# Patient Record
Sex: MALE | Race: BLACK OR AFRICAN AMERICAN
[De-identification: names, ages, dates, MRNs, and addresses within clinical notes are randomized per-mention and may not be internally consistent; named-entity substitution may affect disease eponyms.]

---

## 2017-10-09 ENCOUNTER — HOSPITAL ENCOUNTER (EMERGENCY)
Dept: HOSPITAL 62 - ER | Age: 67
Discharge: HOME | End: 2017-10-09
Payer: OTHER GOVERNMENT

## 2017-10-09 VITALS — SYSTOLIC BLOOD PRESSURE: 154 MMHG | DIASTOLIC BLOOD PRESSURE: 74 MMHG

## 2017-10-09 DIAGNOSIS — K52.1: Primary | ICD-10-CM

## 2017-10-09 DIAGNOSIS — R53.81: ICD-10-CM

## 2017-10-09 DIAGNOSIS — F17.200: ICD-10-CM

## 2017-10-09 DIAGNOSIS — R91.1: ICD-10-CM

## 2017-10-09 DIAGNOSIS — T37.3X5A: ICD-10-CM

## 2017-10-09 LAB
ALBUMIN SERPL-MCNC: 4 G/DL (ref 3.5–5)
ALP SERPL-CCNC: 81 U/L (ref 38–126)
ALT SERPL-CCNC: 23 U/L (ref 21–72)
ANION GAP SERPL CALC-SCNC: 12 MMOL/L (ref 5–19)
APPEARANCE UR: CLEAR
AST SERPL-CCNC: 19 U/L (ref 17–59)
BASOPHILS # BLD AUTO: 0.1 10^3/UL (ref 0–0.2)
BASOPHILS NFR BLD AUTO: 1.1 % (ref 0–2)
BILIRUB DIRECT SERPL-MCNC: 0.4 MG/DL (ref 0–0.4)
BILIRUB SERPL-MCNC: 0.7 MG/DL (ref 0.2–1.3)
BILIRUB UR QL STRIP: NEGATIVE
BUN SERPL-MCNC: 10 MG/DL (ref 7–20)
CALCIUM: 9.7 MG/DL (ref 8.4–10.2)
CHLORIDE SERPL-SCNC: 98 MMOL/L (ref 98–107)
CO2 SERPL-SCNC: 35 MMOL/L (ref 22–30)
CREAT SERPL-MCNC: 0.8 MG/DL (ref 0.52–1.25)
EOSINOPHIL # BLD AUTO: 0.3 10^3/UL (ref 0–0.6)
EOSINOPHIL NFR BLD AUTO: 5.2 % (ref 0–6)
ERYTHROCYTE [DISTWIDTH] IN BLOOD BY AUTOMATED COUNT: 13.9 % (ref 11.5–14)
GLUCOSE SERPL-MCNC: 89 MG/DL (ref 75–110)
GLUCOSE UR STRIP-MCNC: NEGATIVE MG/DL
HCT VFR BLD CALC: 53.5 % (ref 37.9–51)
HGB BLD-MCNC: 18 G/DL (ref 13.5–17)
HGB HCT DIFFERENCE: 0.5
KETONES UR STRIP-MCNC: 20 MG/DL
LIPASE SERPL-CCNC: 37.1 U/L (ref 23–300)
LYMPHOCYTES # BLD AUTO: 1.4 10^3/UL (ref 0.5–4.7)
LYMPHOCYTES NFR BLD AUTO: 25.3 % (ref 13–45)
MCH RBC QN AUTO: 29.8 PG (ref 27–33.4)
MCHC RBC AUTO-ENTMCNC: 33.7 G/DL (ref 32–36)
MCV RBC AUTO: 89 FL (ref 80–97)
MONOCYTES # BLD AUTO: 0.6 10^3/UL (ref 0.1–1.4)
MONOCYTES NFR BLD AUTO: 11.2 % (ref 3–13)
NEUTROPHILS # BLD AUTO: 3.2 10^3/UL (ref 1.7–8.2)
NEUTS SEG NFR BLD AUTO: 57.2 % (ref 42–78)
NITRITE UR QL STRIP: NEGATIVE
PH UR STRIP: 7 [PH] (ref 5–9)
POTASSIUM SERPL-SCNC: 4.2 MMOL/L (ref 3.6–5)
PROT SERPL-MCNC: 7.1 G/DL (ref 6.3–8.2)
PROT UR STRIP-MCNC: NEGATIVE MG/DL
RBC # BLD AUTO: 6.04 10^6/UL (ref 4.35–5.55)
SODIUM SERPL-SCNC: 144.7 MMOL/L (ref 137–145)
SP GR UR STRIP: 1.01
UROBILINOGEN UR-MCNC: NEGATIVE MG/DL (ref ?–2)
WBC # BLD AUTO: 5.6 10^3/UL (ref 4–10.5)

## 2017-10-09 PROCEDURE — 81001 URINALYSIS AUTO W/SCOPE: CPT

## 2017-10-09 PROCEDURE — 74176 CT ABD & PELVIS W/O CONTRAST: CPT

## 2017-10-09 PROCEDURE — 36415 COLL VENOUS BLD VENIPUNCTURE: CPT

## 2017-10-09 PROCEDURE — 80053 COMPREHEN METABOLIC PANEL: CPT

## 2017-10-09 PROCEDURE — 85025 COMPLETE CBC W/AUTO DIFF WBC: CPT

## 2017-10-09 PROCEDURE — 99284 EMERGENCY DEPT VISIT MOD MDM: CPT

## 2017-10-09 PROCEDURE — 83690 ASSAY OF LIPASE: CPT

## 2017-10-09 NOTE — ER DOCUMENT REPORT
ED General





- General


Chief Complaint: Abdominal Pain


Stated Complaint: ABDOMINAL PAIN


Time Seen by Provider: 10/09/17 14:29


Mode of Arrival: Ambulatory


Information source: Patient


Notes: 





Patient reports that he went to the [facility] and received a one-time 

dose of Flagyl.  He states after that she has had significant diarrhea and 

lower abdominal cramping.  Some nausea but no vomiting.  He is also decreased 

appetite.  The cramping has been constant.  Nothing makes it better or worse.  

It is bilateral lower quadrants but does not radiate.  It is been moderate in 

intensity.  No pain in his penis.  No penile discharge.  No testicular pain or 

swelling.


TRAVEL OUTSIDE OF THE [place] IN LAST 30 DAYS: No





- Related Data


Allergies/Adverse Reactions: 


 





No Known Allergies Allergy (Verified 09/07/14 11:13)


 











Past Medical History





- Social History


Smoking Status: Current Every Day Smoker


Chew tobacco use (# tins/day): No


Frequency of alcohol use: Heavy


Drug Abuse: Marijuana


Family History: Reviewed & Not Pertinent


Patient has suicidal ideation: No


Patient has homicidal ideation: No


Pulmonary Medical History: 


   Denies: Hx Tuberculosis


Neurological Medical History: Denies: Hx Seizures


Renal/ Medical History: Denies: Hx Peritoneal Dialysis


Musculoskeltal Medical History: Reports Hx Arthritis


Past Surgical History: Reports: Hx Orthopedic Surgery - nimco in the right leg.  

Denies: Hx Pacemaker





- Immunizations


Hx Diphtheria, Pertussis, Tetanus Vaccination: Yes - unknown





Review of Systems





- Review of Systems


Constitutional: Malaise.  denies: Chills, Fever


Cardiovascular: denies: Chest pain, Dyspnea


Respiratory: denies: Cough, Short of breath


Gastrointestinal: Diarrhea.  denies: Vomiting


-: Yes All other systems reviewed and negative





Physical Exam





- Vital signs


Vitals: 





 











Temp Pulse Resp BP Pulse Ox


 


 97.9 F   69   21 H  143/95 H  97 


 


 10/09/17 14:03  10/09/17 14:03  10/09/17 14:03  10/09/17 14:03  10/09/17 14:03











Interpretation: Hypertensive





- General


General appearance: Appears well, Alert





- HEENT


Head: Normocephalic, Atraumatic


Eyes: Normal


Pupils: PERRL





- Respiratory


Respiratory status: No respiratory distress


Chest status: Nontender


Breath sounds: Normal


Chest palpation: Normal





- Cardiovascular


Rhythm: Regular


Heart sounds: Normal auscultation


Murmur: No





- Abdominal


Inspection: Normal


Distension: No distension


Bowel sounds: Normal


Tenderness: Tender - bilat LQ. no rebound or guarding


Organomegaly: No organomegaly





- Back


Back: Normal, Nontender





- Extremities


General upper extremity: Normal inspection, Nontender, Normal color, Normal ROM

, Normal temperature


General lower extremity: Normal inspection, Nontender, Normal color, Normal ROM

, Normal temperature, Normal weight bearing.  No: Silvestre's sign





- Neurological


Neuro grossly intact: Yes


Cognition: Normal


Orientation: AAOx4


[place] Coma Scale Eye Opening: Spontaneous


[place] Coma Scale Verbal: Oriented


[place] Coma Scale Motor: Obeys Commands


[place] Coma Scale Total: 15


Speech: Normal


Motor strength normal: LUE, RUE, LLE, RLE


Sensory: Normal





- Psychological


Associated symptoms: Normal affect, Normal mood





- Skin


Skin Temperature: Warm


Skin Moisture: Dry


Skin Color: Normal





Course





- Vital Signs


Vital signs: 





 











Temp Pulse Resp BP Pulse Ox


 


 97.9 F   69   21 H  143/95 H  97 


 


 10/09/17 14:03  10/09/17 14:03  10/09/17 14:03  10/09/17 14:03  10/09/17 14:03














- Laboratory


Result Diagrams: 


 10/09/17 14:40





 10/09/17 14:40


Laboratory results interpreted by me: 





 











  10/09/17 10/09/17 10/09/17





  14:40 14:40 14:40


 


RBC  6.04 H  


 


Hgb  18.0 H  


 


Hct  53.5 H  


 


Carbon Dioxide   35 H 


 


Urine Ketones    20 H














- Diagnostic Test


Radiology reviewed: Image reviewed, Reports reviewed - CT shows no acute 

pathology that would explain the pain.  Patient does have a 3 mm pulmonary 

nodule which she was educated about the need for follow-up for.





Discharge





- Discharge


Clinical Impression: 


 Diarrhea due to drug





Condition: Stable


Disposition: HOME, SELF-CARE


Instructions:  Abdominal Pain ([facility])


Additional Instructions: 


Please follow-up with your primary care physician as soon as possible.





You have a very small 3 mm nodule in your lung.  This is most likely not a 

cancerous nodule but will need to be rechecked to make sure it is safe.  This 

needs to be reevaluated within 6 months by another CT scan of your chest.  

Please ask your  primary care provider to schedule this.  


Prescriptions: 


Loperamide HCl [Imodium 2 mg Capsule] 2 mg PO Q4HP PRN #21 cap


 PRN Reason: 


Forms:  Elevated Blood Pressure


Referrals: 


EUGENIA AMES MD [ACTIVE STAFF] - Follow up in 1 week

## 2017-10-09 NOTE — RADIOLOGY REPORT (SQ)
EXAM DESCRIPTION:  CT ABD/PELVIS NO ORAL OR IV



COMPLETED DATE/TIME:  10/9/2017 2:49 pm



REASON FOR STUDY:  rlq pain



COMPARISON:  None.



TECHNIQUE:  CT scan of the abdomen and pelvis performed without intravenous or oral contrast. Images 
reviewed with lung, soft tissue, and bone windows. Reconstructed coronal and sagittal MPR images revi
ewed. All images stored on PACS.

All CT scanners at this facility use dose modulation, iterative reconstruction, and/or weight based d
osing when appropriate to reduce radiation dose to as low as reasonably achievable (ALARA).

[facility]: Dose Right  [facility]: CareDose    [facility]: Dose Right    [facility]: Teradose 4D    [facility]: Smart Numascale



RADIATION DOSE:  Up-to-date CT equipment and radiation dose reduction techniques were employed. CTDIv
ol: 6.7 mGy. DLP: 311 mGy-cm.mGy.



LIMITATIONS:  None.



FINDINGS:  LOWER CHEST: There is a 3 mm nodule at the left hemidiaphragm on image 10 series 4.

NON-CONTRASTED LIVER, SPLEEN, ADRENALS: Evaluation limited by lack of IV contrast. No identified sign
ificant masses.

PANCREAS: No masses. No peripancreatic inflammatory changes.

GALLBLADDER: No identified stones by CT criteria. No inflammatory changes to suggest cholecystitis.

RIGHT KIDNEY AND URETER: No suspicious masses. Assessment limited by lack of IV contrast.   No signif
icant calcifications.   No hydronephrosis or hydroureter.

LEFT KIDNEY AND URETER: No suspicious masses. Assessment limited by lack of IV contrast.   No signifi
cant calcifications.   No hydronephrosis or hydroureter.

AORTA AND RETROPERITONEUM: No aneurysm. No retroperitoneal masses or adenopathy.

BOWEL AND PERITONEAL CAVITY: No obvious masses or inflammatory changes. No free fluid.

APPENDIX: Not identified.

PELVIS, BLADDER, AND ABDOMINAL WALL:No abnormal masses. No free fluid. Bladder normal.

BONES: Lumbar degenerative disc changes.  Scoliosis.  No osseous lesions.

OTHER: No other significant finding.



IMPRESSION:  1.  3 mm left pulmonary nodule.

2.  Lumbar degenerative changes.

3.  There are no findings that explain patient's pain.



COMMENT:  Quality ID # [id number]: Final reports with documentation of one or more dose reduction techniques
 (e.g., Automated exposure control, adjustment of the mA and/or kV according to patient size, use of 
iterative reconstruction technique)



TECHNICAL DOCUMENTATION:  JOB ID:  [id number]

 [employer]- All Rights Reserved

## 2020-03-10 ENCOUNTER — HOSPITAL ENCOUNTER (OUTPATIENT)
Dept: HOSPITAL 62 - RAD | Age: 70
End: 2020-03-10
Attending: CLINICAL NURSE SPECIALIST
Payer: OTHER GOVERNMENT

## 2020-03-10 DIAGNOSIS — R91.8: Primary | ICD-10-CM

## 2020-03-10 PROCEDURE — A9552 F18 FDG: HCPCS

## 2020-03-10 PROCEDURE — 78815 PET IMAGE W/CT SKULL-THIGH: CPT

## 2020-03-10 NOTE — RADIOLOGY REPORT (SQ)
EXAM DESCRIPTION:  PET CT SKULL/THIGH



COMPLETED DATE/TIME:  3/10/2020 12:39 pm



REASON FOR STUDY:  OTHER NONSPECIFIC ABNORMAL FINDING OF LUNG FIELD R91.8  OTHER NONSPECIFIC ABNORMAL
 FINDING OF LUNG FIELD



COMPARISON:  CT abdomen pelvis 10/9/2017

PET-CT 1/31/2016



RADIONUCLIDE AND DOSE:  9.3 mCi F18 FDG

The route of agent administration: Intravenous



FASTING BLOOD SUGAR:  91 mg/dl



CONTRAST TYPE AND DOSE:  No CT contrast given.



TECHNIQUE:  Blood glucose level was verified.  Above dose of FDG was injected intravenously.  2-D seg
mented attenuation correction images were obtained from the base of the skull to the midthighs.  Nonc
ontrast CT images were obtained for attenuation correction and fusion with emission images.  CT image
s were performed without oral or intravenous contrast and are not sensitive for parenchymal lesions. 
 A series of overlapping emission PET images were obtained.  Images reviewed and manipulated at [facility] work station by the radiologist.  Images stored on PACS.



LIMITATIONS:  None.



FINDINGS:  HEAD AND NECK: No areas of abnormal metabolic activity in the soft tissues of the head and
 neck.

CHEST: In the right upper lobe, a low-density 1.8 x 1.5 cm mass is present with lobular borders immed
iately ventral to the right hilum on axial image 90.  This is non metabolic, with SUV of 0.8 (was 1.4
 x 1.3 cm in size with SUV of 0.8 on PET-CT 1/31/2016).

No hilar or mediastinal abnormal activity.  No other lung nodules.

ABDOMEN AND PELVIS: No areas of abnormal metabolic activity in the abdomen or pelvis.  Expected physi
ologic activity is present in the genitourinary system and bowel.

PROXIMAL LOWER EXTREMITIES: No areas of abnormal metabolic activity in the soft tissues of the lower 
extremities.

BONES: No abnormal metabolic activity in the visualized skeleton.

ADDITIONAL CT FINDINGS: Rightward lateral bladder is irregular in shape with a mass at the right blad
earle trigone, 4.4 cm in size.  SUV of this mass is 10

OTHER: Liver background activity 1.8 SUV.  Blood pool background activity 1.7 SUV.



IMPRESSION:  Non metabolic 1.8 x 1.5 cm lung mass just ventral to the right hilum.  This is similar c
ompared to PET-CT in 2016.

Incidental finding of a 4.4 cm rightward bladder trigone mass worrisome for primary bladder tumor



TECHNICAL DOCUMENTATION:  JOB ID:  [id number]

 2011 [employer]- All Rights Reserved



Reading location - IP/workstation name: [email or web address]

## 2020-08-24 ENCOUNTER — HOSPITAL ENCOUNTER (EMERGENCY)
Dept: HOSPITAL 62 - ER | Age: 70
Discharge: HOME | End: 2020-08-24
Payer: OTHER GOVERNMENT

## 2020-08-24 VITALS — SYSTOLIC BLOOD PRESSURE: 154 MMHG | DIASTOLIC BLOOD PRESSURE: 94 MMHG

## 2020-08-24 DIAGNOSIS — R19.09: ICD-10-CM

## 2020-08-24 DIAGNOSIS — C67.9: Primary | ICD-10-CM

## 2020-08-24 DIAGNOSIS — F17.200: ICD-10-CM

## 2020-08-24 DIAGNOSIS — Z79.899: ICD-10-CM

## 2020-08-24 LAB
ADD MANUAL DIFF: NO
ALBUMIN SERPL-MCNC: 4.1 G/DL (ref 3.5–5)
ALP SERPL-CCNC: 115 U/L (ref 38–126)
ANION GAP SERPL CALC-SCNC: 10 MMOL/L (ref 5–19)
APPEARANCE UR: (no result)
APTT PPP: (no result) S
AST SERPL-CCNC: 27 U/L (ref 17–59)
BASOPHILS # BLD AUTO: 0.1 10^3/UL (ref 0–0.2)
BASOPHILS NFR BLD AUTO: 1.1 % (ref 0–2)
BILIRUB DIRECT SERPL-MCNC: 0.1 MG/DL (ref 0–0.4)
BILIRUB SERPL-MCNC: 0.7 MG/DL (ref 0.2–1.3)
BILIRUB UR QL STRIP: NEGATIVE
BUN SERPL-MCNC: 27 MG/DL (ref 7–20)
CALCIUM: 9.9 MG/DL (ref 8.4–10.2)
CHLORIDE SERPL-SCNC: 104 MMOL/L (ref 98–107)
CO2 SERPL-SCNC: 27 MMOL/L (ref 22–30)
EOSINOPHIL # BLD AUTO: 0.3 10^3/UL (ref 0–0.6)
EOSINOPHIL NFR BLD AUTO: 3.4 % (ref 0–6)
ERYTHROCYTE [DISTWIDTH] IN BLOOD BY AUTOMATED COUNT: 19.3 % (ref 11.5–14)
GLUCOSE SERPL-MCNC: 107 MG/DL (ref 75–110)
GLUCOSE UR STRIP-MCNC: NEGATIVE MG/DL
HCT VFR BLD CALC: 36.8 % (ref 37.9–51)
HGB BLD-MCNC: 12 G/DL (ref 13.5–17)
KETONES UR STRIP-MCNC: NEGATIVE MG/DL
LYMPHOCYTES # BLD AUTO: 1.6 10^3/UL (ref 0.5–4.7)
LYMPHOCYTES NFR BLD AUTO: 19.8 % (ref 13–45)
MCH RBC QN AUTO: 25.1 PG (ref 27–33.4)
MCHC RBC AUTO-ENTMCNC: 32.7 G/DL (ref 32–36)
MCV RBC AUTO: 77 FL (ref 80–97)
MONOCYTES # BLD AUTO: 0.8 10^3/UL (ref 0.1–1.4)
MONOCYTES NFR BLD AUTO: 9.4 % (ref 3–13)
NEUTROPHILS # BLD AUTO: 5.3 10^3/UL (ref 1.7–8.2)
NEUTS SEG NFR BLD AUTO: 66.3 % (ref 42–78)
NITRITE UR QL STRIP: POSITIVE
PH UR STRIP: 5 [PH] (ref 5–9)
PLATELET # BLD: 474 10^3/UL (ref 150–450)
POTASSIUM SERPL-SCNC: 5.3 MMOL/L (ref 3.6–5)
PROT SERPL-MCNC: 7.8 G/DL (ref 6.3–8.2)
PROT UR STRIP-MCNC: 100 MG/DL
RBC # BLD AUTO: 4.79 10^6/UL (ref 4.35–5.55)
SP GR UR STRIP: 1.02
TOTAL CELLS COUNTED % (AUTO): 100 %
UROBILINOGEN UR-MCNC: 2 MG/DL (ref ?–2)
WBC # BLD AUTO: 8 10^3/UL (ref 4–10.5)

## 2020-08-24 PROCEDURE — 36415 COLL VENOUS BLD VENIPUNCTURE: CPT

## 2020-08-24 PROCEDURE — 87086 URINE CULTURE/COLONY COUNT: CPT

## 2020-08-24 PROCEDURE — 85025 COMPLETE CBC W/AUTO DIFF WBC: CPT

## 2020-08-24 PROCEDURE — 80053 COMPREHEN METABOLIC PANEL: CPT

## 2020-08-24 PROCEDURE — 83690 ASSAY OF LIPASE: CPT

## 2020-08-24 PROCEDURE — 76705 ECHO EXAM OF ABDOMEN: CPT

## 2020-08-24 PROCEDURE — 99284 EMERGENCY DEPT VISIT MOD MDM: CPT

## 2020-08-24 PROCEDURE — 81001 URINALYSIS AUTO W/SCOPE: CPT

## 2020-08-24 NOTE — ER DOCUMENT REPORT
ED Medical Screen (RME)





- General


Chief Complaint: Abscess


Stated Complaint: RIGHT SIDED ABSCESS


Time Seen by Provider: 08/24/20 11:20


Primary Care Provider: 


IMELDA BARLOW NP [Primary Care Provider] - Follow up as needed


Mode of Arrival: Ambulatory


Information source: Patient


Notes: 





70-year-old male presented to ED for large mass to the right flank for over a 

month.  He states it is slowly grown and itches but is not painful.  He states 

he is not been able to tender this due to the fact that he recently was 

diagnosed with bladder cancer and is been having procedures for that.  He is 

alert oriented respirations regular nonlabored speaking in full sentences.  He 

does have a history of of a right leg fracture with a nimco and plate and a spinal

fusion.  He does smoke half pack a day drinks 2-3 times a week and uses 

marijuana daily.  He is alert oriented respirations regular nonlabored speaking 

in full sentences.

















I have greeted and performed a rapid initial assessment of this patient.  A 

comprehensive ED assessment and evaluation of the patient, analysis of test 

results and completion of medical decision making process will be conducted by 

an additional ED providers.


TRAVEL OUTSIDE OF THE [place] IN LAST 30 DAYS: No





- Related Data


Allergies/Adverse Reactions: 


                                        





No Known Allergies Allergy (Verified 09/07/14 11:13)


   








Home Medications: Bactrim





Past Medical History





- Social History


Chew tobacco use (# tins/day): No


Frequency of alcohol use: Occasional


Drug Abuse: Marijuana


Pulmonary Medical History: 


   Denies: Hx Tuberculosis


Neurological Medical History: Denies: Hx Seizures


Renal/ Medical History: Denies: Hx Peritoneal Dialysis


Musculoskeltal Medical History: Reports Hx Arthritis


Past Surgical History: Reports: Hx Orthopedic Surgery - nimco in the right leg.  

Denies: Hx Pacemaker





- Immunizations


Hx Diphtheria, Pertussis, Tetanus Vaccination: Yes - unknown





Physical Exam





- Vital signs


Vitals: 





                                        











Temp Pulse Resp BP Pulse Ox


 


 98.3 F   100   20   150/88 H  99 


 


 08/24/20 10:58  08/24/20 10:58  08/24/20 10:58  08/24/20 10:58  08/24/20 10:58














Course





- Vital Signs


Vital signs: 





                                        











Temp Pulse Resp BP Pulse Ox


 


 98.3 F   100   20   150/88 H  99 


 


 08/24/20 10:58  08/24/20 10:58  08/24/20 10:58  08/24/20 10:58  08/24/20 10:58














Doctor's Discharge





- Discharge


Referrals: 


IMELDA BARLOW NP [Primary Care Provider] - Follow up as needed

## 2020-08-24 NOTE — ER DOCUMENT REPORT
ED General





- General


Chief Complaint: Abscess


Stated Complaint: RIGHT SIDED ABSCESS


Time Seen by Provider: 08/24/20 11:20


Primary Care Provider: 


IMELDA BARLOW NP [NO LOCAL MD] - Follow up as needed


Mode of Arrival: Ambulatory


Notes: 





70 year old male with a history of bladder cancer currently being worked [facility] 

[facility] presents to the ED complaining of a lump on his right flank th

at has been getting steadily larger for the past 2 months and is intensely 

pruritic but not painful.  Denies any injury to the area, denies any drainage.  

Denies any imaging of this area.  States he has been so busy being worked up for

bladder cancer that he has not had a chance to tell anybody about this mass.  

Has not yet had his PET scan.


TRAVEL OUTSIDE OF THE [place] IN LAST 30 DAYS: No





- Related Data


Allergies/Adverse Reactions: 


                                        





No Known Allergies Allergy (Verified 09/07/14 11:13)


   








Home Medications: Bactrim





Past Medical History





- General


Information source: Patient





- Social History


Smoking Status: Current Every Day Smoker


Chew tobacco use (# tins/day): No


Frequency of alcohol use: Occasional


Drug Abuse: Marijuana


Family History: Reviewed & Not Pertinent


Patient has homicidal ideation: No


Pulmonary Medical History: 


   Denies: Hx Tuberculosis


Neurological Medical History: Denies: Hx Seizures


Renal/ Medical History: Denies: Hx Peritoneal Dialysis


Musculoskeletal Medical History: Reports Hx Arthritis


Past Surgical History: Reports: Hx Orthopedic Surgery - nimco in the right leg.  

Denies: Hx Pacemaker





- Immunizations


Hx Diphtheria, Pertussis, Tetanus Vaccination: Yes - unknown





Review of Systems





- Review of Systems


Constitutional: No symptoms reported


EENT: No symptoms reported


Genitourinary: See HPI, Other - Right flank mass, itching.  Bladder cancer


Male Genitourinary: See HPI - Bladder cancer


-: Yes All other systems reviewed and negative





Physical Exam





- Vital signs


Vitals: 





                                        











Temp Pulse Resp BP Pulse Ox


 


 98.3 F   100   20   150/88 H  99 


 


 08/24/20 10:58  08/24/20 10:58  08/24/20 10:58  08/24/20 10:58  08/24/20 10:58











Interpretation: Hypertensive





- Notes


Notes: 





GENERAL: Alert, interacts well.  No acute distress.


HEAD: Normocephalic, atraumatic


EYES: Pupils equal, round and reactive to light, extraocular movements intact.


ENT: Oral mucosa moist, tongue midline. 


NECK: Full range of motion, supple, trachea midline.


LUNGS: no respiratory distress.


ABDOMEN: Soft, nontender, approximately 4 cm round mass to the right flank in 

the mid axillary line, mild erythema, no fluctuance, no drainage, nontender, 

semi-firm, fixed.  


EXTREMITIES: Moves all 4 extremities spontaneously, no edema, radial and 

dorsalis pedis pulses 2/4 bilaterally.  No cyanosis.  


NEUROLOGICAL: Alert and oriented x3, normal speech.


PSYCH: Normal mood, normal affect.


SKIN: Warm, Dry.  Lesion as described above.





Course





- Re-evaluation


Re-evalutation: 





08/24/20 15:07


CBC shows mild anemia with hemoglobin 12.0, no leukocytosis, platelets somewhat 

elevated at 474, chemistries show elevated potassium at 5.3, BUN elevated at 27,

otherwise unremarkable, urinalysis shows moderate blood and positive nitrites 

however the patient is taking Pyridium daily for pain from his bladder cancer.  

This likely explains these findings, no actual evidence of infection.  Will be 

sent for culture.  Patient is also already taking Bactrim at home.





                                        





Abdomen Ultrasound  08/24/20 11:21


IMPRESSION:  4.3 x 3.9 x 3.0 cm solid soft tissue mass within the subcutaneous 

tissues of the right flank corresponding to patient's palpable abnormality and 

highly suspicious for metastatic disease.  This would be amenable to ultrasound-

guided biopsy as clinically indicated.


 








Patient is known to have bladder cancer, is given a copy of all of his reports 

as well as his imaging study burned onto a CD.  Patient is already scheduled for

a PET scan.  Patient is instructed to call his primary and his specialists today

or tomorrow to inform them of this finding.  Patient is aware that this is 

highly suspicious for metastatic disease from his primary bladder cancer.





- Vital Signs


Vital signs: 





                                        











Temp Pulse Resp BP Pulse Ox


 


 98.3 F   100   20   150/88 H  99 


 


 08/24/20 10:58  08/24/20 10:58  08/24/20 10:58  08/24/20 10:58  08/24/20 10:58














- Laboratory


Result Diagrams: 


                                 08/24/20 11:27





                                 08/24/20 11:27


Laboratory results interpreted by me: 





                                        











  08/24/20 08/24/20 08/24/20





  11:27 11:27 11:27


 


Hgb  12.0 L  


 


Hct  36.8 L  


 


MCV  77 L  


 


MCH  25.1 L  


 


RDW  19.3 H  


 


Plt Count  474 H  


 


Potassium   5.3 H 


 


BUN   27 H 


 


Urine Protein    100 H


 


Urine Blood    MODERATE H


 


Urine Nitrite    POSITIVE H


 


Urine Urobilinogen    2.0 H














Discharge





- Discharge


Clinical Impression: 


 Right flank mass





Condition: Stable


Disposition: HOME, SELF-CARE


Additional Instructions: 


The growth on the right-hand side of your abdomen is very suspicious for cancer.

 It looks like it may be metastatic disease from your bladder cancer.  We do 

recommend that you have a biopsy of this as soon as possible to determine 

exactly where this growth is coming from.  It is not an abscess.  It is not an 

infection.  Antibiotics will not make this better.





I have printed you copies of your laboratory studies as well as the ultrasound 

report and a CD of your ultrasound report so you may share it with both your 

primary care physician as well as with the specialists from [place] who are 

treating your bladder cancer.


Referrals: 


IMELDA BARLOW NP [NO LOCAL MD] - Follow up as needed

## 2020-08-24 NOTE — RADIOLOGY REPORT (SQ)
EXAM DESCRIPTION:  U/S ABDOMEN LIMITED W/O DOP



IMAGES COMPLETED DATE/TIME:  8/24/2020 12:42 pm



REASON FOR STUDY:  Large mass on right flank



COMPARISON:  PET-CT 3/10/2020.



TECHNIQUE:  Dynamic and static grayscale images acquired of the localized site of clinical concern an
d recorded on PACS. Additional selected color Doppler and spectral images recorded.

SITE OF CONCERN: Right flank



LIMITATIONS:  None.



FINDINGS:  SKIN AND SUBCUTANEOUS TISSUES: There is a superficial ovoid solid hypoechoic subcutaneous 
mass measuring 4.3 x 3.9 x 3.0 cm within the soft tissues of the right flank corresponding to patient
's palpable abnormality.  Area likely corresponds to subcutaneous nodule on prior PET measuring 9 mm.
  There is some internal vascularity noted.  No discrete drainable fluid collection.

DEEP SOFT TISSUES/MUSCLES: Not imaged.

OTHER: No other significant finding.



IMPRESSION:  4.3 x 3.9 x 3.0 cm solid soft tissue mass within the subcutaneous tissues of the right f
lank corresponding to patient's palpable abnormality and highly suspicious for metastatic disease.  T
his would be amenable to ultrasound-guided biopsy as clinically indicated.



TECHNICAL DOCUMENTATION:  JOB ID:  [id number]

 [employer]- All Rights Reserved



Reading location - IP/workstation name: [email or web address]

## 2020-09-15 ENCOUNTER — HOSPITAL ENCOUNTER (OUTPATIENT)
Dept: HOSPITAL 62 - RAD | Age: 70
End: 2020-09-15
Attending: INTERNAL MEDICINE
Payer: OTHER GOVERNMENT

## 2020-09-15 DIAGNOSIS — I10: ICD-10-CM

## 2020-09-15 DIAGNOSIS — C79.89: ICD-10-CM

## 2020-09-15 DIAGNOSIS — C67.0: Primary | ICD-10-CM

## 2020-09-15 PROCEDURE — 38505 NEEDLE BIOPSY LYMPH NODES: CPT

## 2020-09-15 PROCEDURE — 88313 SPECIAL STAINS GROUP 2: CPT

## 2020-09-15 PROCEDURE — 88341 IMHCHEM/IMCYTCHM EA ADD ANTB: CPT

## 2020-09-15 PROCEDURE — 88342 IMHCHEM/IMCYTCHM 1ST ANTB: CPT

## 2020-09-15 PROCEDURE — 88305 TISSUE EXAM BY PATHOLOGIST: CPT

## 2020-09-15 NOTE — RADIOLOGY REPORT (SQ)
EXAM DESCRIPTION:  U/S BIOPSY SUPERFIC LYMPH NODE



IMAGES COMPLETED DATE/TIME:  9/15/2020 1:33 pm



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
E OF BLADDER



COMPARISON:  None.



TECHNIQUE:  The procedure was discussed with the patient and the patient agreed to proceed.

The patient was scanned and the area of interest in the right flank was imaged.  This area was target
ed for ultrasound-guided core biopsy.

After sterile skin prep and 2 mL local lidocaine 1 % skin and deep tissue anesthesia, a 20 gauge biop
sy needle was used to obtain several cores of tissue from the lesion.  There were no immediate post-p
rocedure complications.

Pathology is pending.



LIMITATIONS:  None.



FINDINGS:  Ultrasound guided biopsy as described above.



IMPRESSION:  ULTRASOUND-GUIDED CORE BIOPSY.



COMMENT:  Patient medication list reviewed: Yes- Quality ID# [id number]:Eligible professional attests to doc
umenting in the medical record they obtained, updated, or reviewed the patient's current medications.




TECHNICAL DOCUMENTATION:  JOB ID:  [id number]

 2011 [employer]- All Rights Reserved



Reading location - IP/workstation name: [email or web address]

## 2020-11-11 ENCOUNTER — HOSPITAL ENCOUNTER (OUTPATIENT)
Dept: HOSPITAL 62 - CCL | Age: 70
Discharge: HOME | End: 2020-11-11
Attending: RADIOLOGY
Payer: OTHER GOVERNMENT

## 2020-11-11 DIAGNOSIS — C67.9: Primary | ICD-10-CM

## 2020-11-11 DIAGNOSIS — Z53.9: ICD-10-CM

## 2020-11-11 LAB
%HYPO/RBC NFR BLD AUTO: SLIGHT %
ABSOLUTE LYMPHOCYTES# (MANUAL): 1.6 10^3/UL (ref 0.5–4.7)
ABSOLUTE MONOCYTES # (MANUAL): 0.6 10^3/UL (ref 0.1–1.4)
ADD MANUAL DIFF: YES
ANISOCYTOSIS BLD QL SMEAR: (no result)
APTT BLD: 41.7 SEC (ref 23.5–35.8)
BASOPHILS NFR BLD MANUAL: 0 % (ref 0–2)
DACRYOCYTES BLD QL SMEAR: (no result)
EOSINOPHIL NFR BLD MANUAL: 4 % (ref 0–6)
ERYTHROCYTE [DISTWIDTH] IN BLOOD BY AUTOMATED COUNT: 21.8 % (ref 11.5–14)
HCT VFR BLD CALC: 29.7 % (ref 37.9–51)
HGB BLD-MCNC: 9.9 G/DL (ref 13.5–17)
INR PPP: 0.94
MCH RBC QN AUTO: 26.9 PG (ref 27–33.4)
MCHC RBC AUTO-ENTMCNC: 33.5 G/DL (ref 32–36)
MCV RBC AUTO: 80 FL (ref 80–97)
METAMYELOCYTES % (MANUAL): 1 % (ref 0–1)
MONOCYTES % (MANUAL): 10 % (ref 3–13)
NEUTS BAND NFR BLD MANUAL: 1 % (ref 3–5)
NRBC BLD AUTO-RTO: 1 /100 WBC
PLATELET # BLD: 408 10^3/UL (ref 150–450)
PLATELET CLUMP BLD QL SMEAR: PRESENT
PLATELET COMMENT: ADEQUATE
POIKILOCYTOSIS BLD QL SMEAR: (no result)
POLYCHROMASIA BLD QL SMEAR: (no result)
PROTHROMBIN TIME: 12.8 SEC (ref 11.4–15.4)
RBC # BLD AUTO: 3.7 10^6/UL (ref 4.35–5.55)
SEGMENTED NEUTROPHILS % (MAN): 56 % (ref 42–78)
TOTAL CELLS COUNTED BLD: 100
VARIANT LYMPHS NFR BLD MANUAL: 23 % (ref 13–45)
WBC # BLD AUTO: 5.6 10^3/UL (ref 4–10.5)

## 2020-11-11 PROCEDURE — 36415 COLL VENOUS BLD VENIPUNCTURE: CPT

## 2020-11-11 PROCEDURE — 85025 COMPLETE CBC W/AUTO DIFF WBC: CPT

## 2020-11-11 PROCEDURE — 85610 PROTHROMBIN TIME: CPT

## 2020-11-11 PROCEDURE — 85730 THROMBOPLASTIN TIME PARTIAL: CPT

## 2020-11-12 NOTE — XMS REPORT
Patient Summary Document

                          Created on:2020



Patient:ALVIN VITALE

Sex:Male

:1950

External Reference #:[id number]





Demographics







                          [place] [address]

 

                          Home Phone                [phone]

 

                          Work Phone                [phone]

 

                          Mobile Phone              [phone]

 

                          Preferred Language        [id number]

 

                          Marital Status            Unknown

 

                          Sikh Affiliation     Unknown

 

                          Race                      Unknown

 

                          Additional Race(s)        Unavailable



                                                    Other

 

                          Ethnic Group              Unknown









Author







                          Organization              [facility]

 

                          Address                   [facility] [address]



                                                    [place] [address]

 

                          Phone                     [phone]









Care Team Providers







                    Name                Role                Phone

 

                    Eamon Ledezma M.D. Attending Clinician Unavailable

 

                    Kayli Cowart        Attending Clinician Unavailable









Allergies, Adverse Reactions, Alerts







        Allergy Allergy Status  Severity Reaction(s) Onset   Inactive Treating C

alondra



        Name    Type                            Date    Date    Clinician 

 

        Asiprin Allergy to Active                                          



                Drug                                                    



                (Finding)                                                 







Medications







       Ordered Filled Start  Stop   Current Ordering Indication Dosage Frequency

 Signature

                          Comments                  Components



      Medication Medication Date  Date  Medication? Clinician                   

(SIG)       



      Name  Name                                                        

 

      Dexamethaso       2020       No                10mg        Dexamethas   

    



      ne Sodium       0-30                                      one Sodium      

 



      Phosphate       00:00:                                     Phosphate      

 



                  00                                                    

 

      Sodium       2020       No                40mL        Sodium       



      Chloride       0-30                                      Chloride       



                  00:00:                                                 



                  00                                                    

 

      Gemcitabine       2020       No                1760mg       Gemcitabin  

     



      HCl         0-30                                      e HCl       



                  00:00:                                                 



                  00                                                    

 

      Ondansetron       2020 2020- No                8mg         Ondansetro   

    



      HCl         0-19  10-19                               n HCl       



                  00:00: 00:00                                           



                  00    :00                                             

 

      Palonosetro       2020       No                .25mg       Palonosetr   

    



      n HCl       0-16                                      on HCl       



                  00:00:                                                 



                  00                                                    

 

      Dexamethaso       2020       No                10mg        Dexamethas   

    



      ne Sodium       0-16                                      one Sodium      

 



      Phosphate       00:00:                                     Phosphate      

 



                  00                                                    

 

      Sodium       2020       No                80mL        Sodium       



      Chloride       0-16                                      Chloride       



                  00:00:                                                 



                  00                                                    

 

      Fosaprepita       2020       No                150mg       Fosaprepit   

    



      nt          0-16                                      ant         



      Dimeglumine       00:00:                                     Dimeglumin   

    



                  00                                        e           

 

      Gemcitabine       2020       No                1770mg       Gemcitabin  

     



      HCl         0-16                                      e HCl       



                  00:00:                                                 



                  00                                                    

 

      Gemcitabine       2020       No                1750mg       Gemcitabin  

     



      HCl         0-02                                      e HCl       



                  00:00:                                                 



                  00                                                    

 

      Feraheme       2020       No                510mg       Feraheme       



                  0-02                                                  



                  00:00:                                                 



                  00                                                    

 

      Dexamethaso       2020-1       No                10mg        Dexamethas   

    



      ne Sodium       0-02                                      one Sodium      

 



      Phosphate       00:00:                                     Phosphate      

 



                  00                                                    

 

      Sodium       2020-1       No                90mL        Sodium       



      Chloride       0-02                                      Chloride       



                  00:00:                                                 



                  00                                                    

 

      Hydration       2020-0 2020- No                1000mL                   



      1000mL NS       9-28  10-19                                           



                  00:00: 00:00                                           



                  00    :00                                             

 

      Tylenol       -0 2020- No                650mg       Tylenol       



                                                             



                  00:00: 00:00                                           



                  00    :00                                             

 

      Carboplatin       2020-0       Yes               537mg                   



                                                                    



                  00:00:                                                 



                  00                                                    

 

      Dexamethaso       2020-0       Yes               10mg        Dexamethas   

    



      ne Sodium       9-25                                      one Sodium      

 



      Phosphate       00:00:                                     Phosphate      

 



                  00                                                    

 

      Gemcitabine       -0       Yes               1760mg       Gemcitabin  

     



      HCl                                               e HCl       



                  00:00:                                                 



                  00                                                    

 

      Megestrol       -0       Yes               10mL                    



      Acetate                                                         



                  00:00:                                                 



                  00                                                    

 

      Ondansetron       -0       Yes               1                       



      HCl                                                           



                  00:00:                                                 



                  00                                                    

 

      Promethazin       -0       Yes               1                       



      e HCl                                                         



                  00:00:                                                 



                  00                                                    

 

      Sodium       -0       Yes               40mL        Sodium       



      Chloride       9-25                                      Chloride       



                  00:00:                                                 



                  00                                                    

 

      Levaquin       2020-0       Yes               1                       



                                                                    



                  00:00:                                                 



                  00                                                    

 

      Fosaprepita       -0 2020- No                150mg       Fosaprepit   

    



      nt            10-16                               ant         



      Dimeglumine       00:00: 00:00                               Dimeglumin   

    



                  00    :00                                 e           

 

      Palonosetro       -0 2020- No                .25mg       Palonosetr   

    



      n HCl         10-16                               on HCl       



                  00:00: 00:00                                           



                  00    :00                                             

 

      Feraheme       2020-0 2020- No                510mg       Feraheme       



                  9-25  10-02                                           



                  00:00: 00:00                                           



                  00    :00                                             

 

      Rocephin       -0 2020- No                1g                      



                                                             



                  00:00: 00:00                                           



                  00    :00                                             

 

      amLODIPine                   Yes               1                       



      Besylate                                                             

 

      Cetirizine                   Yes               1                       



      HCl                                                               

 

      Fluticasone                   Yes               1                       



      Furoate                                                             







Problems







        Condition Condition Condition Status  Onset   Resolution Last    Treatin

g Comments



        Name    Details Category         Date    Date    Treatment Clinician 



                                                        Date            

 

        Nausea  Nausea  Diagnosis active  2020                            



                                        00:00:                          



                                        00                              

 

        Dehydration Dehydration Diagnosis active                              



                                        00:00:                          



                                        00                              

 

        Patient Patient Diagnosis active                            



        encounter encounter                                             



        status  status                  00:00:                          



                                        00                              

 

        Urinary Urinary Diagnosis active                            



        tract   tract                                               



        infectious infectious                 00:00:                          



        disease disease                 00                              

 

        Intestinal Intestinal Diagnosis active                                  



        malabsorpti malabsorpti                                                 



        on      on                                                      

 

        Iron    Iron    Diagnosis active                                  



        deficiency deficiency                                                 



        anemia  anemia                                                  

 

        Malignant Malignant Diagnosis active                                  



        tumor of tumor of                                                 



        trigone of trigone of                                                 



        urinary urinary                                                 



        bladder bladder                                                 







Procedures







                Procedure       Date / Time Performed Performing Clinician Devic

e

 

                OFFICE/OUTPATIENT VISIT NEW 2019 14:30:00                 

 

                colonoscopy     2015 00:00:00                 

 

                neck surgery                                    

 

                R leg nimco placement                                 

 

                Cystoscopy                                      

 

                TURBT                                           







Results







           Test Description Test Time  Test Comments Text Results Atomic Results

 Result 

Comments









                WBC             2020 09:30:00                 









                          Test Item    Value        Reference Range Comments









                WBC (test code = WBC) 2.2000          4.0000-10.0000  

 

                Lymphocytes % (test code = Lymphocytes %) 52.3000 %       22.400

0-43.6000 

 

                MID% (test code = MID%) 7.9000 %        .-  

 

                Neutrophils % (test code = Neutrophils %) 39.8000 %       48.900

0-69.9000 

 

                Lymphocytes (test code = Lymphocytes) 1.1000          1.2000-3.2

000   

 

                MID (test code = MID) 0.2000          0.1000-1.1000   

 

                Neutrophils (test code = Neutrophils) 0.9000          1.5000-6.7

000   

 

                RBC (test code = RBC) 3.4300          3.7000-4.9000   

 

                HGB (test code = HGB) 8.9000 g/dL     11.2000-18.0000 

 

                HCT (test code = HCT) 27.1000 %       34.0000-44.0000 

 

                MCV (test code = MCV) 79.0000 fL      80.0000-94.0000 

 

                MCH (test code = MCH) 26.1000 pg      27.0000-34.0000 

 

                MCHC (test code = MCHC) 33.1000 g/dL    31.5000-36.0000 

 

                RDW (test code = RDW) 19.2000         11.0000-18.0000 

 

                PLT (test code = PLT) 234.0000        140.0000-440.0000 

 

                MPV (test code = MPV) 7.2000 fL       6.8000-10.6000  



WBC2020-10-30 08:51:00





                Test Item       Value           Reference Range Comments

 

                WBC (test code = WBC) 7.3000          4.0000-10.0000  

 

                Lymphocytes % (test code = Lymphocytes %) 20.7000 %       22.400

0-43.6000 

 

                MID% (test code = MID%) 5.8000 %        .2000-11.  

 

                Neutrophils % (test code = Neutrophils %) 73.5000 %       48.900

0-69.9000 

 

                Lymphocytes (test code = Lymphocytes) 1.5000          1.2000-3.2

000   

 

                MID (test code = MID) 0.4000          0.1000-1.1000   

 

                Neutrophils (test code = Neutrophils) 5.4000          1.5000-6.7

000   

 

                RBC (test code = RBC) 4.1800          3.7000-4.9000   

 

                HGB (test code = HGB) 10.4000 g/dL    11.-18.0000 

 

                HCT (test code = HCT) 32.4000 %       34.0000-44.0000 

 

                MCV (test code = MCV) 77.4000 fL      80.0000-94.0000 

 

                MCH (test code = MCH) 24.9000 pg      27.0000-34.0000 

 

                MCHC (test code = MCHC) 32.2000 g/dL    31.5000-36.0000 

 

                RDW (test code = RDW) 18.5000         11.0000-18.0000 

 

                PLT (test code = PLT) 148.0000        140.0000-440.0000 

 

                MPV (test code = MPV) 7.5000 fL       6.8000-10.6000  



WBC2020-10-23 08:43:00





                Test Item       Value           Reference Range Comments

 

                WBC (test code = WBC) 2.0000          4.0000-10.0000  

 

                Lymphocytes % (test code = Lymphocytes %) 63.9000 %       22.400

0-43.6000 

 

                MID% (test code = MID%) 6.0000 %        1.-11.  

 

                Neutrophils % (test code = Neutrophils %) 30.1000 %       48.900

0-69.9000 

 

                Lymphocytes (test code = Lymphocytes) 1.2000          1.2000-3.2

000   

 

                MID (test code = MID) 0.2000          0.1000-1.1000   

 

                Neutrophils (test code = Neutrophils) 0.6000          1.5000-6.7

000   

 

                RBC (test code = RBC) 4.0800          3.7000-4.9000   

 

                HGB (test code = HGB) 10.6000 g/dL    11.2000-18.0000 

 

                HCT (test code = HCT) 31.3000 %       34.0000-44.0000 

 

                MCV (test code = MCV) 76.8000 fL      80.0000-94.0000 

 

                MCH (test code = MCH) 26.1000 pg      27.0000-34.0000 

 

                MCHC (test code = MCHC) 33.9000 g/dL    31.5000-36.0000 

 

                RDW (test code = RDW) 18.0000         11.0000-18.0000 

 

                PLT (test code = PLT) 501.0000        140.0000-440.0000 

 

                MPV (test code = MPV) 6.9000 fL       6.8000-10.6000  



RDW2020-10-23 08:43:00





                Test Item       Value           Reference Range Comments

 

                RDW (test code = RDW) 18.0000         11.0000-18.0000 

 

                MID% (test code = MID%) 6.0000 %        1.2000-11.2000  

 

                Neutrophils % (test code = Neutrophils %) 30.1000 %       48.900

0-69.9000 

 

                HCT (test code = HCT) 31.3000 %       34.0000-44.0000 

 

                Lymphocytes % (test code = Lymphocytes %) 63.9000 %       22.400

0-43.6000 

 

                MCHC (test code = MCHC) 33.9000 g/dL    31.5000-36.0000 

 

                HGB (test code = HGB) 10.6000 g/dL    11.2000-18.0000 

 

                MCH (test code = MCH) 26.1000 pg      27.0000-34.0000 

 

                MPV (test code = MPV) 6.9000 fL       6.8000-10.6000  

 

                MCV (test code = MCV) 76.8000 fL      80.0000-94.0000 

 

                PLT (test code = PLT) 501.0000        140.0000-440.0000 

 

                WBC (test code = WBC) 2.0000          4.0000-10.0000  

 

                RBC (test code = RBC) 4.0800          3.7000-4.9000   

 

                Lymphocytes (test code = Lymphocytes) 1.2000          1.2000-3.2

000   

 

                MID (test code = MID) 0.2000          0.1000-1.1000   

 

                Neutrophils (test code = Neutrophils) 0.6000          1.5000-6.7

000   



Urine Culture Status2020-10-20 13:09:00





                Test Item       Value           Reference Range Comments

 

                Urine Culture Status (test code = Urine Culture Final report    

                



                Status)                                         

 

                Urine Culture Result 1 (test code = Urine No growth             

          



                Culture Result 1)                                 



Urine Culture Result 12020-10-20 13:09:00





                Test Item       Value           Reference Range Comments

 

                Urine Culture Result 1 (test code = Urine No growth             

          



                Culture Result 1)                                 

 

                Urine Culture Status (test code = Urine Culture Final report    

                



                Status)                                         



U Color2020-10-19 10:41:00





                Test Item       Value           Reference Range Comments

 

                U Color (test code = U Color) Yellow                          

 

                U Urobilinogen (test code = U Urobilinogen) 0.2                 

            

 

                U Specific Gravity (test code = U Specific Gravity) 1.0200      

                    

 

                U Appearance (test code = U Appearance) Cloudy                  

        

 

                U Glucose (test code = U Glucose) Negative                      

  

 

                U Bilirubin (test code = U Bilirubin) Negative                  

      

 

                U Ketones (test code = U Ketones) Negative                      

  

 

                U Blood (test code = U Blood) Negative                        

 

                U pH (test code = U pH) 7.5000          5.0000-8.0000   

 

                U Protein (test code = U Protein) Negative                      

  

 

                U Nitrite (test code = U Nitrite) Negative                      

  

 

                U Leuk Esterase (test code = U Leuk Esterase) Small             

              



U pH2020-10-19 10:41:00





                Test Item       Value           Reference Range Comments

 

                U pH (test code = U pH) 7.5000          5.0000-8.0000   

 

                U Appearance (test code = U Appearance) Cloudy                  

        

 

                U Protein (test code = U Protein) Negative                      

  

 

                U Specific Gravity (test code = U Specific Gravity) 1.0200      

                    

 

                U Urobilinogen (test code = U Urobilinogen) 0.2                 

            

 

                U Nitrite (test code = U Nitrite) Negative                      

  

 

                U Ketones (test code = U Ketones) Negative                      

  

 

                U Leuk Esterase (test code = U Leuk Esterase) Small             

              

 

                U Color (test code = U Color) Yellow                          

 

                U Glucose (test code = U Glucose) Negative                      

  

 

                U Bilirubin (test code = U Bilirubin) Negative                  

      

 

                U Blood (test code = U Blood) Negative                        



Creatinine2020-10-16 10:21:17





                Test Item       Value           Reference Range Comments

 

                Creatinine (test code = Creatinine) 0.8000 mg/dL    0.5000-1.500

0   

 

                Cr Clearance (Est) (test code = Cr Clearance 82.1900         85.

0000-125.0000 



                (Est))                                          



Cr Clearance (Est)2020-10-16 10:21:17





                Test Item       Value           Reference Range Comments

 

                Cr Clearance (Est) (test code = Cr Clearance 82.1900         85.

0000-125.0000 



                (Est))                                          

 

                Creatinine (test code = Creatinine) 0.8000 mg/dL    0.5000-1.500

0   



WBC2020-10-16 09:09:00





                Test Item       Value           Reference Range Comments

 

                WBC (test code = WBC) 3.4000          4.0000-10.0000  

 

                Lymphocytes % (test code = Lymphocytes %) 35.7000 %       22.400

0-43.6000 

 

                MID% (test code = MID%) 8.6000 %        1.2000-11.2000  

 

                Neutrophils % (test code = Neutrophils %) 55.7000 %       48.900

0-69.9000 

 

                Lymphocytes (test code = Lymphocytes) 1.2000          1.2000-3.2

000   

 

                MID (test code = MID) 0.3000          0.1000-1.1000   

 

                Neutrophils (test code = Neutrophils) 1.9000          1.5000-6.7

000   

 

                RBC (test code = RBC) 4.0500          3.7000-4.9000   

 

                HGB (test code = HGB) 10.2000 g/dL    11.2000-18.0000 

 

                HCT (test code = HCT) 31.8000 %       34.0000-44.0000 

 

                MCV (test code = MCV) 78.4000 fL      80.0000-94.0000 

 

                MCH (test code = MCH) 25.2000 pg      27.0000-34.0000 

 

                MCHC (test code = MCHC) 32.1000 g/dL    31.5000-36.0000 

 

                RDW (test code = RDW) 18.5000         11.0000-18.0000 

 

                PLT (test code = PLT) 633.0000        140.0000-440.0000 

 

                MPV (test code = MPV) 7.0000 fL       6.8000-10.6000  



[id number] 09:09:00





                Test Item       Value           Reference Range Comments

 

                Creatinine (test code = Creatinine) 0.8000 mg/dL    0.5000-1.200

0   

 

                Cr Clearance (Est) (test code = Cr 82.1900         85.0000-125.0

000 



                Clearance (Est))                                 

 

                Glucose (test code = Glucose) 106.0000 mg/dL  70.0000-118.0000 

 

                BUN (test code = BUN) 7.0000 mg/dL    7.0000-22.0000  

 

                Sodium (test code = Sodium) 139.0000 mmol/L 128.0000-145.0000 

 

                Potassium (test code = Potassium) 3.6000 mmol/L   3.6000-5.1000 

  

 

                Chloride (test code = Chloride) 104.0000 mmol/L 96.0000-108.0000

 

 

                CO2 (test code = CO2) 31.0000 mmol/L  18.0000-33.0000 

 

                Calcium (test code = Calcium) 8.8600 mg/dL    8.0000-10.3000  

 

                Alkaline Phosphatase (test code = Alkaline 96.0000         42.00

.0000 



                Phosphatase)                                    

 

                ALT (SGPT) (test code = ALT (SGPT)) 6.0000          10.0000-47.0

000 

 

                AST (SGOT) (test code = AST (SGOT)) 11.0000         11.0000-37.0

000 

 

                Bilirubin, Total (test code = Bilirubin, 0.3000 mg/dL    0.0000-

1.6000   



                Total)                                          

 

                Albumin (test code = Albumin) 3.9000 g/dL     3.5000-5.5000   

 

                Protein, Total (test code = Protein, 6.2000 g/dL     6.4000-8.10

00   



                Total)                                          

 

                eGFR -American (test code = eGFR 116.0000        60.0000-

200.0000 



                -American)                                 

 

                eGFR Non--American (test code = 96.0000         60.0000-2

00.0000 



                eGFR Non--American)                                 



[id number] 09:09:00





                Test Item       Value           Reference Range Comments

 

                RDW (test code = RDW) 18.5000         11.0000-18.0000 

 

                Lymphocytes % (test code = Lymphocytes %) 35.7000 %       22.400

0-43.6000 

 

                HCT (test code = HCT) 31.8000 %       34.0000-44.0000 

 

                MID% (test code = MID%) 8.6000 %        1.2000-11.2000  

 

                Neutrophils % (test code = Neutrophils %) 55.7000 %       48.900

0-69.9000 

 

                HGB (test code = HGB) 10.2000 g/dL    11.2000-18.0000 

 

                MCHC (test code = MCHC) 32.1000 g/dL    31.5000-36.0000 

 

                MCV (test code = MCV) 78.4000 fL      80.0000-94.0000 

 

                MPV (test code = MPV) 7.0000 fL       6.8000-10.6000  

 

                MCH (test code = MCH) 25.2000 pg      27.0000-34.0000 

 

                Neutrophils (test code = Neutrophils) 1.9000          1.5000-6.7

000   

 

                Lymphocytes (test code = Lymphocytes) 1.2000          1.2000-3.2

000   

 

                MID (test code = MID) 0.3000          0.1000-1.1000   

 

                RBC (test code = RBC) 4.0500          3.7000-4.9000   

 

                WBC (test code = WBC) 3.4000          4.0000-10.0000  

 

                PLT (test code = PLT) 633.0000        140.0000-440.0000 



[id number] 09:09:00





                Test Item       Value           Reference Range Comments

 

                Chloride (test code = Chloride) 104.0000 mmol/L 96.0000-108.0000

 

 

                ALT (SGPT) (test code = ALT (SGPT)) 6.0000          10.0000-47.0

000 

 

                AST (SGOT) (test code = AST (SGOT)) 11.0000         11.0000-37.0

000 

 

                Alkaline Phosphatase (test code = Alkaline 96.0000         42.00

.0000 



                Phosphatase)                                    

 

                Protein, Total (test code = Protein, 6.2000 g/dL     6.4000-8.10

00   



                Total)                                          

 

                Albumin (test code = Albumin) 3.9000 g/dL     3.5000-5.5000   

 

                Bilirubin, Total (test code = Bilirubin, 0.3000 mg/dL    0.0000-

1.6000   



                Total)                                          

 

                BUN (test code = BUN) 7.0000 mg/dL    7.0000-22.0000  

 

                Calcium (test code = Calcium) 8.8600 mg/dL    8.0000-10.3000  

 

                Glucose (test code = Glucose) 106.0000 mg/dL  70.0000-118.0000 

 

                eGFR Non--American (test code = 96.0000         60.0000-2

00.0000 



                eGFR Non--American)                                 

 

                CO2 (test code = CO2) 31.0000 mmol/L  18.0000-33.0000 

 

                Potassium (test code = Potassium) 3.6000 mmol/L   3.6000-5.1000 

  

 

                Cr Clearance (Est) (test code = Cr 82.1900         85.0000-125.0

000 



                Clearance (Est))                                 

 

                Sodium (test code = Sodium) 139.0000 mmol/L 128.0000-145.0000 

 

                eGFR -American (test code = eGFR 116.0000        60.0000-

200.0000 



                -American)                                 

 

                Creatinine (test code = Creatinine) 0.8000 mg/dL    0.5000-1.200

0   



[id number] 09:52:00





                Test Item       Value           Reference Range Comments

 

                WBC (test code = WBC) 1.9000          4.0000-10.0000  

 

                Lymphocytes % (test code = Lymphocytes %) 66.2000 %       22.400

0-43.6000 

 

                MID% (test code = MID%) 3.7000 %        1.2000-11.2000  

 

                Neutrophils % (test code = Neutrophils %) 30.1000 %       48.900

0-69.9000 

 

                Lymphocytes (test code = Lymphocytes) 1.3000          1.2000-3.2

000   

 

                MID (test code = MID) 0.1000          0.1000-1.1000   

 

                Neutrophils (test code = Neutrophils) 0.5000          1.5000-6.7

000   

 

                RBC (test code = RBC) 3.6500          3.7000-4.9000   

 

                HGB (test code = HGB) 9.1000 g/dL     .-18.0000 

 

                HCT (test code = HCT) 27.6000 %       34.0000-44.0000 

 

                MCV (test code = MCV) 75.5000 fL      80.0000-94.0000 

 

                MCH (test code = MCH) 24.9000 pg      27.0000-34.0000 

 

                MCHC (test code = MCHC) 33.0000 g/dL    31.5000-36.0000 

 

                RDW (test code = RDW)          11.0000-18.0000 

 

                PLT (test code = PLT) 74.0000         140.0000-440.0000 

 

                MPV (test code = MPV) 7.0000 fL       6.8000-10.6000  



[id number] 09:52:00





                Test Item       Value           Reference Range Comments

 

                PLT (test code = PLT) 74.0000         140.0000-440.0000 

 

                WBC (test code = WBC) 1.9000          4.0000-10.0000  

 

                RBC (test code = RBC) 3.6500          3.7000-4.9000   

 

                Lymphocytes (test code = Lymphocytes) 1.3000          1.2000-3.2

000   

 

                Neutrophils (test code = Neutrophils) 0.5000          1.5000-6.7

000   

 

                MID (test code = MID) 0.1000          0.1000-1.1000   

 

                MCH (test code = MCH) 24.9000 pg      27.0000-34.0000 

 

                MCV (test code = MCV) 75.5000 fL      80.0000-94.0000 

 

                MPV (test code = MPV) 7.0000 fL       6.8000-10.6000  

 

                MCHC (test code = MCHC) 33.0000 g/dL    31.5000-36.0000 

 

                HGB (test code = HGB) 9.1000 g/dL     .-18.0000 

 

                MID% (test code = MID%) 3.7000 %        .-  

 

                Neutrophils % (test code = Neutrophils %) 30.1000 %       48.900

0-69.9000 

 

                HCT (test code = HCT) 27.6000 %       34.0000-44.0000 

 

                Lymphocytes % (test code = Lymphocytes %) 66.2000 %       22.400

0-43.6000 

 

                RDW (test code = RDW) 16.         11.0000-18.0000 



[id number] 11:56:00





                Test Item       Value           Reference Range Comments

 

                WBC (test code = WBC) 2.9000          4.0000-10.0000  

 

                Lymphocytes % (test code = Lymphocytes %) 49.0000 %       22.400

0-43.6000 

 

                MID% (test code = MID%) 6.6000 %        .-11  

 

                Neutrophils % (test code = Neutrophils %) 44.4000 %       48.900

0-69.9000 

 

                Lymphocytes (test code = Lymphocytes) 1.4000          1.2000-3.2

000   

 

                MID (test code = MID) 0.2000          0.1000-1.1000   

 

                Neutrophils (test code = Neutrophils) 1.3000          1.5000-6.7

000   

 

                RBC (test code = RBC) 4.4300          3.7000-4.9000   

 

                HGB (test code = HGB) 10.8000 g/dL    11.-18.0000 

 

                HCT (test code = HCT) 34.1000 %       34.0000-44.0000 

 

                MCV (test code = MCV) 77.1000 fL      80.0000-94.0000 

 

                MCH (test code = MCH) 24.4000 pg      27.0000-34.0000 

 

                MCHC (test code = MCHC) 31.6000 g/dL    31.5000-36.0000 

 

                RDW (test code = RDW) 17.7000         11.0000-18.0000 

 

                PLT (test code = PLT) 277.0000        140.0000-440.0000 

 

                MPV (test code = MPV) 7.2000 fL       6.8000-10.6000  



[id number] 11:56:00





                Test Item       Value           Reference Range Comments

 

                RDW (test code = RDW) 17.7000         11.0000-18.0000 

 

                Lymphocytes % (test code = Lymphocytes %) 49.0000 %       22.400

0-43.6000 

 

                HCT (test code = HCT) 34.1000 %       34.0000-44.0000 

 

                Neutrophils % (test code = Neutrophils %) 44.4000 %       48.900

0-69.9000 

 

                MID% (test code = MID%) 6.6000 %        1.2000-11.2000  

 

                MCHC (test code = MCHC) 31.6000 g/dL    31.5000-36.0000 

 

                MPV (test code = MPV) 7.2000 fL       6.8000-10.6000  

 

                MCV (test code = MCV) 77.1000 fL      80.0000-94.0000 

 

                MCH (test code = MCH) 24.4000 pg      27.0000-34.0000 

 

                RBC (test code = RBC) 4.4300          3.7000-4.9000   

 

                Neutrophils (test code = Neutrophils) 1.3000          1.5000-6.7

000   

 

                HGB (test code = HGB) 10.8000 g/dL    11.2000-18.0000 

 

                Lymphocytes (test code = Lymphocytes) 1.4000          1.2000-3.2

000   

 

                MID (test code = MID) 0.2000          0.1000-1.1000   

 

                WBC (test code = WBC) 2.9000          4.0000-10.0000  

 

                PLT (test code = PLT) 277.0000        140.0000-440.0000 



Occult Blood # 11:59:24





                Test Item       Value           Reference Range Comments

 

                Occult Blood #1 (test code = Occult Blood #1) POSITIVE          

              

 

                Occult Blood #2 (test code = Occult Blood #2) POSITIVE          

              

 

                Occult Blood #3 (test code = Occult Blood #3) POSITIVE          

              



Urine Culture Result  11:35:00





                Test Item       Value           Reference Range Comments

 

                Urine Culture Result 1 (test code = Urine No growth             

          



                Culture Result 1)                                 

 

                Urine Culture Status (test code = Urine Culture Final report    

                



                Status)                                         



Urine Culture [id number] 11:35:00





                Test Item       Value           Reference Range Comments

 

                Urine Culture Status (test code = Urine Culture Final report    

                



                Status)                                         

 

                Urine Culture Result 1 (test code = Urine No growth             

          



                Culture Result 1)                                 



U Specific [id number] 10:02:00





                Test Item       Value           Reference Range Comments

 

                U Specific Gravity (test code = U Specific Gravity) 1.0250      

                    

 

                U Protein (test code = U Protein) >=300                         

  

 

                U Appearance (test code = U Appearance) Turbid                  

        

 

                U Bilirubin (test code = U Bilirubin) Negative                  

      

 

                U Blood (test code = U Blood) Large                           

 

                U Color (test code = U Color) Mylene                           

 

                U Glucose (test code = U Glucose) Negative                      

  

 

                U Ketones (test code = U Ketones) Trace                         

  

 

                U Leuk Esterase (test code = U Leuk Esterase) Large             

              

 

                U Nitrite (test code = U Nitrite) Negative                      

  

 

                U Urobilinogen (test code = U Urobilinogen) 1                   

            

 

                U pH (test code = U pH) 6.5000          5.0000-8.0000   



U [id number] 10:02:00





                Test Item       Value           Reference Range Comments

 

                U Color (test code = U Color) Mylene                           

 

                U Urobilinogen (test code = U Urobilinogen) 1                   

            

 

                U Specific Gravity (test code = U Specific Gravity) 1.0250      

                    

 

                U Appearance (test code = U Appearance) Turbid                  

        

 

                U Glucose (test code = U Glucose) Negative                      

  

 

                U Bilirubin (test code = U Bilirubin) Negative                  

      

 

                U Ketones (test code = U Ketones) Trace                         

  

 

                U Blood (test code = U Blood) Large                           

 

                U pH (test code = U pH) 6.5000          5.0000-8.0000   

 

                U Protein (test code = U Protein) >=300                         

  

 

                U Nitrite (test code = U Nitrite) Negative                      

  

 

                U Leuk Esterase (test code = U Leuk Esterase) Large             

              



[id number] 08:42:00





                Test Item       Value           Reference Range Comments

 

                HCT (test code = HCT) 38.6000 %       34.0000-44.0000 

 

                MID% (test code = MID%) 4.2000 %        1.2000-11.2000  

 

                Lymphocytes % (test code = Lymphocytes %) 12.6000 %       22.400

0-43.6000 

 

                Neutrophils % (test code = Neutrophils %) 83.2000 %       48.900

0-69.9000 

 

                RDW (test code = RDW) 18.0000         11.0000-18.0000 

 

                MCH (test code = MCH) 25.4000 pg      27.0000-34.0000 

 

                MCV (test code = MCV) 77.4000 fL      80.0000-94.0000 

 

                MPV (test code = MPV) 7.3000 fL       6.8000-10.6000  

 

                HGB (test code = HGB) 12.7000 g/dL    11.2000-18.0000 

 

                MCHC (test code = MCHC) 32.8000 g/dL    31.5000-36.0000 

 

                WBC (test code = WBC) 10.7000         4.0000-10.0000  

 

                PLT (test code = PLT) 428.0000        140.0000-440.0000 

 

                MID (test code = MID) 0.5000          0.1000-1.1000   

 

                Neutrophils (test code = Neutrophils) 8.9000          1.5000-6.7

000   

 

                Lymphocytes (test code = Lymphocytes) 1.3000          1.2000-3.2

000   

 

                RBC (test code = RBC) 4.9900          3.7000-4.9000   



Alkaline [id number] 08:42:00





                Test Item       Value           Reference Range Comments

 

                Alkaline Phosphatase (test code = Alkaline 104.0000        42.00

.0000 



                Phosphatase)                                    

 

                ALT (SGPT) (test code = ALT (SGPT)) 5.0000          10.0000-47.0

000 

 

                Sodium (test code = Sodium) 139.0000 mmol/L 128.0000-145.0000 

 

                eGFR -American (test code = eGFR 80.0000         60.0000-

200.0000 



                -American)                                 

 

                eGFR Non--American (test code = 66.0000         60.0000-2

00.0000 



                eGFR Non--American)                                 

 

                Potassium (test code = Potassium) 3.6000 mmol/L   3.6000-5.1000 

  

 

                AST (SGOT) (test code = AST (SGOT)) 14.0000         11.0000-37.0

000 

 

                Chloride (test code = Chloride) 103.0000 mmol/L 96.0000-108.0000

 

 

                CO2 (test code = CO2) 30.0000 mmol/L  18.0000-33.0000 

 

                Creatinine (test code = Creatinine) 1.1000 mg/dL    0.5000-1.200

0   

 

                Glucose (test code = Glucose) 116.0000 mg/dL  70.0000-118.0000 

 

                BUN (test code = BUN) 18.0000 mg/dL   7.0000-22.0000  

 

                Calcium (test code = Calcium) 9.2000 mg/dL    8.0000-10.3000  

 

                Bilirubin, Total (test code = Bilirubin, 0.6000 mg/dL    0.0000-

1.6000   



                Total)                                          

 

                Albumin (test code = Albumin) 4.2000 g/dL     3.5000-5.5000   

 

                Protein, Total (test code = Protein, 7.2000 g/dL     6.4000-8.10

00   



                Total)                                          

 

                Cr Clearance (Est) (test code = Cr 57.6600         85.0000-125.0

000 



                Clearance (Est))                                 



[id number] 08:42:00





                Test Item       Value           Reference Range Comments

 

                WBC (test code = WBC) 10.7000         4.0000-10.0000  

 

                Lymphocytes % (test code = Lymphocytes %) 12.6000 %       22.400

0-43.6000 

 

                MID% (test code = MID%) 4.2000 %        1.2000-11.2000  

 

                Neutrophils % (test code = Neutrophils %) 83.2000 %       48.900

0-69.9000 

 

                Lymphocytes (test code = Lymphocytes) 1.3000          1.2000-3.2

000   

 

                MID (test code = MID) 0.5000          0.1000-1.1000   

 

                Neutrophils (test code = Neutrophils) 8.9000          1.5000-6.7

000   

 

                RBC (test code = RBC) 4.9900          3.7000-4.9000   

 

                HGB (test code = HGB) 12.7000 g/dL    11.2000-18.0000 

 

                HCT (test code = HCT) 38.6000 %       34.0000-44.0000 

 

                MCV (test code = MCV) 77.4000 fL      80.0000-94.0000 

 

                MCH (test code = MCH) 25.4000 pg      27.0000-34.0000 

 

                MCHC (test code = MCHC) 32.8000 g/dL    31.5000-36.0000 

 

                RDW (test code = RDW) 18.0000         11.0000-18.0000 

 

                PLT (test code = PLT) 428.0000        140.0000-440.0000 

 

                MPV (test code = MPV) 7.3000 fL       6.8000-10.6000  



[id number] 08:42:00





                Test Item       Value           Reference Range Comments

 

                Creatinine (test code = Creatinine) 1.1000 mg/dL    0.5000-1.200

0   

 

                Cr Clearance (Est) (test code = Cr 57.6600         85.0000-125.0

000 



                Clearance (Est))                                 

 

                Glucose (test code = Glucose) 116.0000 mg/dL  70.0000-118.0000 

 

                BUN (test code = BUN) 18.0000 mg/dL   7.0000-22.0000  

 

                Sodium (test code = Sodium) 139.0000 mmol/L 128.0000-145.0000 

 

                Potassium (test code = Potassium) 3.6000 mmol/L   3.6000-5.1000 

  

 

                Chloride (test code = Chloride) 103.0000 mmol/L 96.0000-108.0000

 

 

                CO2 (test code = CO2) 30.0000 mmol/L  18.0000-33.0000 

 

                Calcium (test code = Calcium) 9.2000 mg/dL    8.0000-10.3000  

 

                Alkaline Phosphatase (test code = Alkaline 104.0000        42.00

.0000 



                Phosphatase)                                    

 

                ALT (SGPT) (test code = ALT (SGPT)) 5.0000          10.0000-47.0

000 

 

                AST (SGOT) (test code = AST (SGOT)) 14.0000         11.0000-37.0

000 

 

                Bilirubin, Total (test code = Bilirubin, 0.6000 mg/dL    0.0000-

1.6000   



                Total)                                          

 

                Albumin (test code = Albumin) 4.2000 g/dL     3.5000-5.5000   

 

                Protein, Total (test code = Protein, 7.2000 g/dL     6.4000-8.10

00   



                Total)                                          

 

                eGFR -American (test code = eGFR 80.0000         60.0000-

200.0000 



                -American)                                 

 

                eGFR Non--American (test code = 66.0000         60.0000-2

00.0000 



                eGFR Non--American)                                 



[id number] 11:19:00





                Test Item       Value           Reference Range Comments

 

                TIBC (test code = TIBC) 290.0000        250.0000-450.0000 

 

                UIBC (test code = UIBC) 260.0000        111.0000-343.0000 

 

                Iron, Total (test code = Iron, Total) 30.0000         38.0000-16

9.0000 

 

                Protein, Total (test code = Protein, 7.1000 g/dL     6.0000-8.50

00   



                Total)                                          

 

                Globulin (test code = Globulin) 3.1000 g/dL     1.5000-4.5000   

 

                Albumin (test code = Albumin) 4.0000 g/dL     3.8000-4.8000   

 

                Bilirubin, Total (test code = Bilirubin, 0.2000 mg/dL    0.0000-

1.2000   



                Total)                                          

 

                Calcium (test code = Calcium) 9.3000 mg/dL    8.6000-10.2000  

 

                BUN (test code = BUN) 12.0000 mg/dL   8.0000-27.0000  

 

                Glucose (test code = Glucose) 97.0000 mg/dL   65.0000-99.0000 

 

                Creatinine (test code = Creatinine) 1.1400 mg/dL    0.7600-1.270

0   

 

                Vitamin B12 (test code = Vitamin B12) 507.0000 pg/mL  232.0000-1

245.0000 

 

                Cr Clearance (Est) (test code = Cr 56.1100         85.0000-125.0

000 



                Clearance (Est))                                 

 

                Ferritin (test code = Ferritin) 225.0000 ng/mL  30.0000-400.0000

 

 

                Folate (test code = Folate) 6.7000 ng/mL                    

 

                Chloride (test code = Chloride) 104.0000 mmol/L 96.0000-106.0000

 

 

                CO2 (test code = CO2) 25.0000 mmol/L  20.0000-29.0000 

 

                Potassium (test code = Potassium) 3.9000 mmol/L   3.5000-5.2000 

  

 

                Sodium (test code = Sodium) 145.0000 mmol/L 134.0000-144.0000 

 

                Alkaline Phosphatase (test code = 112.0000        39.0000-117.00

00 



                Alkaline Phosphatase)                                 

 

                ALT (SGPT) (test code = ALT (SGPT)) 4.0000          0.0000-44.00

00  

 

                AST (SGOT) (test code = AST (SGOT)) 20.0000         0.0000-40.00

00  

 

                % Iron Saturation (test code = % Iron 10.0000 %       15.0000-55

.0000 



                Saturation)                                     

 

                eGFR -American (test code = eGFR 75.0000                 

        



                -American)                                 

 

                A/G Ratio (test code = A/G Ratio) 1.3000          1.2000-2.2000 

  

 

                BUN/Creat Ratio (test code = BUN/Creat 11.0000         10.0000-2

4.0000 



                Ratio)                                          

 

                eGFR Non--American (test code = 65.0000                  

       



                eGFR Non--American)                                 



[id number] 11:19:00





                Test Item       Value           Reference Range Comments

 

                Creatinine (test code = Creatinine) 1.1400 mg/dL    0.7600-1.270

0   

 

                Cr Clearance (Est) (test code = Cr 56.1100         85.0000-125.0

000 



                Clearance (Est))                                 

 

                Glucose (test code = Glucose) 97.0000 mg/dL   65.0000-99.0000 

 

                BUN (test code = BUN) 12.0000 mg/dL   8.0000-27.0000  

 

                eGFR Non--American (test code = 65.0000                  

       



                eGFR Non--American)                                 

 

                eGFR -American (test code = eGFR 75.0000                 

        



                -American)                                 

 

                BUN/Creat Ratio (test code = BUN/Creat 11.0000         10.0000-2

4.0000 



                Ratio)                                          

 

                Sodium (test code = Sodium) 145.0000 mmol/L 134.0000-144.0000 

 

                Potassium (test code = Potassium) 3.9000 mmol/L   3.5000-5.2000 

  

 

                Chloride (test code = Chloride) 104.0000 mmol/L 96.0000-106.0000

 

 

                CO2 (test code = CO2) 25.0000 mmol/L  20.0000-29.0000 

 

                Calcium (test code = Calcium) 9.3000 mg/dL    8.6000-10.2000  

 

                Protein, Total (test code = Protein, 7.1000 g/dL     6.0000-8.50

00   



                Total)                                          

 

                Albumin (test code = Albumin) 4.0000 g/dL     3.8000-4.8000   

 

                Globulin (test code = Globulin) 3.1000 g/dL     1.5000-4.5000   

 

                A/G Ratio (test code = A/G Ratio) 1.3000          1.2000-2.2000 

  

 

                Bilirubin, Total (test code = Bilirubin, 0.2000 mg/dL    0.0000-

1.2000   



                Total)                                          

 

                Alkaline Phosphatase (test code = 112.0000        39.0000-117.00

00 



                Alkaline Phosphatase)                                 

 

                AST (SGOT) (test code = AST (SGOT)) 20.0000         0.0000-40.00

00  

 

                ALT (SGPT) (test code = ALT (SGPT)) 4.0000          0.0000-44.00

00  

 

                Iron, Total (test code = Iron, Total) 30.0000         38.0000-16

9.0000 

 

                TIBC (test code = TIBC) 290.0000        250.0000-450.0000 

 

                UIBC (test code = UIBC) 260.0000        111.0000-343.0000 

 

                % Iron Saturation (test code = % Iron 10.0000 %       15.0000-55

.0000 



                Saturation)                                     

 

                Vitamin B12 (test code = Vitamin B12) 507.0000 pg/mL  232.0000-1

245.0000 

 

                Folate (test code = Folate) 6.7000 ng/mL                    

 

                Ferritin (test code = Ferritin) 225.0000 ng/mL  30.0000-400.0000

 



[id number] 16:47:00





                Test Item       Value           Reference Range Comments

 

                RDW (test code = RDW) 18.6000         11.0000-18.0000 

 

                HCT (test code = HCT) 38.0000 %       34.0000-44.0000 

 

                Lymphocytes % (test code = Lymphocytes %) 19.2000 %       22.400

0-43.6000 

 

                MID% (test code = MID%) 5.6000 %        1.2000-11.2000  

 

                Neutrophils % (test code = Neutrophils %) 75.2000 %       48.900

0-69.9000 

 

                MCH (test code = MCH) 24.5000 pg      27.0000-34.0000 

 

                MCV (test code = MCV) 76.6000 fL      80.0000-94.0000 

 

                MPV (test code = MPV) 7.0000 fL       6.8000-10.6000  

 

                MCHC (test code = MCHC) 31.9000 g/dL    31.5000-36.0000 

 

                HGB (test code = HGB) 12.1000 g/dL    11.2000-18.0000 

 

                Lymphocytes (test code = Lymphocytes) 1.9000          1.2000-3.2

000   

 

                MID (test code = MID) 0.6000          0.1000-1.1000   

 

                Neutrophils (test code = Neutrophils) 7.6000          1.5000-6.7

000   

 

                PLT (test code = PLT) 351.0000        140.0000-440.0000 

 

                WBC (test code = WBC) 10.1000         4.0000-10.0000  

 

                RBC (test code = RBC) 4.9600          3.7000-4.9000   



[id number] 16:47:00





                Test Item       Value           Reference Range Comments

 

                WBC (test code = WBC) 10.1000         4.0000-10.0000  

 

                Lymphocytes % (test code = Lymphocytes %) 19.2000 %       22.400

0-43.6000 

 

                MID% (test code = MID%) 5.6000 %        1.2000-11.2000  

 

                Neutrophils % (test code = Neutrophils %) 75.2000 %       48.900

0-69.9000 

 

                Lymphocytes (test code = Lymphocytes) 1.9000          1.2000-3.2

000   

 

                MID (test code = MID) 0.6000          0.1000-1.1000   

 

                Neutrophils (test code = Neutrophils) 7.6000          1.5000-6.7

000   

 

                RBC (test code = RBC) 4.9600          3.7000-4.9000   

 

                HGB (test code = HGB) 12.1000 g/dL    11.2000-18.0000 

 

                HCT (test code = HCT) 38.0000 %       34.0000-44.0000 

 

                MCV (test code = MCV) 76.6000 fL      80.0000-94.0000 

 

                MCH (test code = MCH) 24.5000 pg      27.0000-34.0000 

 

                MCHC (test code = MCHC) 31.9000 g/dL    31.5000-36.0000 

 

                RDW (test code = RDW) 18.6000         11.0000-18.0000 

 

                PLT (test code = PLT) 351.0000        140.0000-440.0000 

 

                MPV (test code = MPV) 7.0000 fL       6.8000-10.6000  







Assessments







                Condition Name  Status          Diagnosis Date  Treating Clinici

an

 

                Acute maxillary sinusitis, unspecified Active                   

       

 

                Anesthesia of skin Active                          

 

                Pain in unspecified joint Active                          

 

                Dizziness and giddiness Active                          







Encounters







        Start   End     Encounter Admission Attending Care    Care    Encounter



        Date/Time Date/Time Type    Type    Clinicians Facility Department ID

 

        2020 Outpatient                 [facility]

n [id number]



        00:00:00 00:00:00                          Medical Medical 



                                                Oncology Oncology 



                                                [facility]  

 

        2020 Outpatient                 [facility]

n [id number]



        00:00:00 00:00:00                         [facility] 



                                                Oncology Oncology 



                                                Center  [place]  

 

        2020 Outpatient                 [facility]

n [id number]



        00:00:00 00:00:00                         [facility] 



                                                Oncology Oncology 



                                                [facility]  

 

        2020-10-30 2020-10-30 Outpatient         Li L[facility]zma

rn [id number]



        00:00:00 00:00:00                 [facility] 



                                                Oncology Oncology 



                                                [facility]  

 

        2020-10-29 2020-10-29 Outpatient                 [facility]

n [id number]



        00:00:00 00:00:00                         [facility] 



                                                Oncology Oncology 



                                                [facility]  

 

        2020-10-23 2020-10-23 Outpatient         Li Ledezma

rn [id number]



        00:00:00 00:00:00                 [facility] 



                                                Oncology Oncology 



                                                [facility]  

 

        2020-10-20 2020-10-20 Outpatient                 [facility]

n [id number]



        00:00:00 00:00:00                         [facility] 



                                                Oncology Oncology 



                                                [facility]  

 

        2020-10-19 2020-10-19 Outpatient         Li Ledezma

rn [id number]



        00:00:00 00:00:00                 [facility] 



                                                Oncology Oncology 



                                                [facility]  

 

        2020-10-16 2020-10-16 Li Torrez 2

[id number]



        00:00:00 00:00:00 [facility] 



                                                Oncology Oncology 



                                                [facility]  

 

        2020-10-09 2020-10-09 Outpatient         Li Ledezma

rn [id number]



        00:00:00 00:00:00                 [facility] 



                                                Oncology Oncology 



                                                [facility]  

 

        2020-10-08 2020-10-08 Outpatient                 Li [facility]

n [id number]



        00:00:00 00:00:00                         [facility] 



                                                Oncology Oncology 



                                                Center  [place]  

 

        2020-10-05 2020-10-05 Outpatient         NathanLi valdez Earnest

rn [id number]



        00:00:00 00:00:00                 [facility] 



                                                Oncology Oncology 



                                                [facility]  

 

        2020-10-02 2020-10-02 Outpatient         Li Ledezmabrook

rn [id number]



        00:00:00 00:00:00                 [facility] 



                                                Oncology Oncology 



                                                [facility]  

 

        2020 Outpatient         NathanLi valdez Steffaniee

rn [id number]



        00:00:00 00:00:00                 [facility] 



                                                Oncology Oncology 



                                                [facility]  

 

        2020 Outpatient         NathanLi valdez Earnest

rn [id number]



        00:00:00 00:00:00                 [facility] 



                                                Oncology Oncology 



                                                [facility]  

 

        2020 Li Torrez 2

[id number]



        00:00:00 00:00:00 [facility] 



                                                Oncology Oncology 



                                                [facility]  

 

        2020 Outpatient                 [facility]

n [id number]



        00:00:00 00:00:00                         [facility] 



                                                Oncology Oncology 



                                                [facility]  

 

        2020 Darien Torrez [facility] 

[id number]



        00:00:00 00:00:00 Qian           [facility] 



                                                Oncology Oncology 



                                                [facility]  

 

        2020 Outpatient                 [facility]

n [id number]



        00:00:00 00:00:00                         [facility] 



                                                Oncology Oncology 



                                                [facility]  

 

        2020 Darien Torrez [facility] 

[id number]



        00:00:00 00:00:00 Qian           [facility] 



                                                Oncology Oncology 



                                                [facility]  

 

        2020 Outpatient                 [facility]

n [id number]



        00:00:00 00:00:00                         [facility] 



                                                Oncology Oncology 



                                                [facility]  

 

        2020 Outpatient                 [facility]

n [id number]



        00:00:00 00:00:00                         [facility] 



                                                Oncology Oncology 



                                                [facility]  

 

        2020 Outpatient                 [facility]

n [id number]



        00:00:00 00:00:00                         [facility] 



                                                Oncology Oncology 



                                                [facility]  

 

        2020 Outpatient                 [facility]

n [id number]



        00:00:00 00:00:00                         n Medical Medical 



                                                Oncology Oncology 



                                                Center  [place]  

 

        2020 Outpatient                 Li [facility]

n [id number]



        00:00:00 00:00:00                         n Medical Medical 



                                                Oncology Oncology 



                                                Center  [place]  

 

        2020 Outpatient                 [facility]

n [id number]



        00:00:00 00:00:00                         n Medical Medical 



                                                Oncology Oncology 



                                                Center  [place]  

 

        2020 Outpatient                 Li [facility]

n [id number]



        00:00:00 00:00:00                          Medical Medical 



                                                Oncology Oncology 



                                                Center  [place]  

 

        2020-03-10 2020-03-10 Outpatient                 [facility]

n [id number]



        00:00:00 00:00:00                          Medical Medical 



                                                Oncology Oncology 



                                                [facility]  

 

        2020 Outpatient                 [facility]

n [id number]



        00:00:00 00:00:00                          Medical Medical 



                                                Oncology Oncology 



                                                [facility]  

 

        2019 Outpatient         Bundle, JCP   Clar[facility] [address]

[id number]



          14:30:00  14:30:00                      Kayli                Children



                                       [id number]



                                                                      

s                                       [id number]



                                                         and    [id number]



                                                        [facility] 



                                                        [facility], 







Social History







                    Smoking Status      Start Date          Stop Date

 

                    Current every day smoker 2020-10-16 00:00:00 2020-10-16 00:0

0:00









                          Social History Observation Description

 

                          Birth Sex                 Male







Vital Signs







                Vital Name      Observation Time Observation Value Comments

 

                Bdy height      2020 09:37:25 67.0000 [in_i]  

 

                Body temperature 2020 09:37:25 98.2000 [degF]  

 

                BMI             2020-10-30 09:01:29 22.6500         

 

                BP smith         2020-10-30 09:01:29 79.0000 mm[Hg]  

 

                Bdy height      2020-10-30 09:01:29 67.0000 [in_i]  

 

                SaO2% BldA PulseOx 2020-10-30 09:01:29 97.0000 %       

 

                Heart rate      2020-10-30 09:01:29 103.0000 /min   

 

                Resp rate       2020-10-30 09:01:29 18.0000 /min    

 

                BP sys          2020-10-30 09:01:29 140.0000 mm[Hg] 

 

                Body temperature 2020-10-30 09:01:29 97.6000 [degF]  

 

                Weight          2020-10-30 09:01:29 144.6000 [lb_av] 

 

                BMI             2020-10-23 09:01:04 22.6800         

 

                BP smith         2020-10-23 09:01:04 84.0000 mm[Hg]  

 

                Bdy height      2020-10-23 09:01:04 67.0000 [in_i]  

 

                SaO2% BldA PulseOx 2020-10-23 09:01:04 98.0000 %       

 

                Heart rate      2020-10-23 09:01:04 97.0000 /min    

 

                Resp rate       2020-10-23 09:01:04 18.0000 /min    

 

                BP sys          2020-10-23 09:01:04 133.0000 mm[Hg] 

 

                Body temperature 2020-10-23 09:01:04 97.4000 [degF]  

 

                Weight          2020-10-23 09:01:04 144.8000 [lb_av] 

 

                BMI             2020-10-19 09:47:24 23.4000         

 

                BP smith         2020-10-19 09:47:24 79.0000 mm[Hg]  

 

                Bdy height      2020-10-19 09:47:24 67.0000 [in_i]  

 

                SaO2% BldA PulseOx 2020-10-19 09:47:24 98.0000 %       

 

                Heart rate      2020-10-19 09:47:24 83.0000 /min    

 

                Resp rate       2020-10-19 09:47:24 18.0000 /min    

 

                BP sys          2020-10-19 09:47:24 140.0000 mm[Hg] 

 

                Body temperature 2020-10-19 09:47:24 98.4000 [degF]  

 

                Weight          2020-10-19 09:47:24 149.4000 [lb_av] 

 

                BMI             2020-10-16 09:23:02 23.0200         

 

                BP smith         2020-10-16 09:23:02 90.0000 mm[Hg]  

 

                Bdy height      2020-10-16 09:23:02 67.0000 [in_i]  

 

                SaO2% BldA PulseOx 2020-10-16 09:23:02 98.0000 %       

 

                Heart rate      2020-10-16 09:23:02 96.0000 /min    

 

                Resp rate       2020-10-16 09:23:02 16.0000 /min    

 

                BP sys          2020-10-16 09:23:02 146.0000 mm[Hg] 

 

                Body temperature 2020-10-16 09:23:02 97.9000 [degF]  

 

                Weight          2020-10-16 09:23:02 147.0000 [lb_av] 

 

                Bdy height      2020-10-09 09:53:52 67.0000 [in_i]  

 

                Body temperature 2020-10-09 09:53:52 97.9000 [degF]  

 

                BMI             2020-10-02 13:08:59 22.2100         

 

                BP smith         2020-10-02 13:08:59 74.0000 mm[Hg]  

 

                Bdy height      2020-10-02 13:08:59 67.0000 [in_i]  

 

                SaO2% BldA PulseOx 2020-10-02 13:08:59 97.0000 %       

 

                Heart rate      2020-10-02 13:08:59 104.0000 /min   

 

                Resp rate       2020-10-02 13:08:59 18.0000 /min    

 

                BP sys          2020-10-02 13:08:59 123.0000 mm[Hg] 

 

                Body temperature 2020-10-02 13:08:59 97.1000 [degF]  

 

                Weight          2020-10-02 13:08:59 141.8000 [lb_av] 

 

                BMI             2020 09:44:33 22.1800         

 

                BP smith         2020 09:44:33 92.0000 mm[Hg]  

 

                Bdy height      2020 09:44:33 67.0000 [in_i]  

 

                SaO2% BldA PulseOx 2020 09:44:33 98.0000 %       

 

                Heart rate      2020 09:44:33 108.0000 /min   

 

                Resp rate       2020 09:44:33 20.0000 /min    

 

                BP sys          2020 09:44:33 155.0000 mm[Hg] 

 

                Body temperature 2020 09:44:33 98.0000 [degF]  

 

                Weight          2020 09:44:33 141.6000 [lb_av] 

 

                BMI             2020 09:00:46 22.2100         

 

                BP smith         2020 09:00:46 76.0000 mm[Hg]  

 

                Bdy height      2020 09:00:46 67.0000 [in_i]  

 

                SaO2% BldA PulseOx 2020 09:00:46 98.0000 %       

 

                Heart rate      2020 09:00:46 105.0000 /min   

 

                Resp rate       2020 09:00:46 18.0000 /min    

 

                BP sys          2020 09:00:46 147.0000 mm[Hg] 

 

                Body temperature 2020 09:00:46 97.7000 [degF]  

 

                Weight          2020 09:00:46 141.8000 [lb_av] 

 

                BMI             2020 15:39:48 22.4000         

 

                BP smith         2020 15:39:48 69.0000 mm[Hg]  

 

                Bdy height      2020 15:39:48 67.0000 [in_i]  

 

                SaO2% BldA PulseOx 2020 15:39:48 97.0000 %       

 

                Heart rate      2020 15:39:48 101.0000 /min   

 

                Resp rate       2020 15:39:48 18.0000 /min    

 

                BP sys          2020 15:39:48 103.0000 mm[Hg] 

 

                Body temperature 2020 15:39:48 98.6000 [degF]  

 

                Weight          2020 15:39:48 143.0000 [lb_av]

## 2020-11-13 ENCOUNTER — HOSPITAL ENCOUNTER (OUTPATIENT)
Dept: HOSPITAL 62 - CCL | Age: 70
Discharge: HOME | End: 2020-11-13
Attending: RADIOLOGY
Payer: OTHER GOVERNMENT

## 2020-11-13 VITALS — DIASTOLIC BLOOD PRESSURE: 74 MMHG | SYSTOLIC BLOOD PRESSURE: 144 MMHG

## 2020-11-13 DIAGNOSIS — Z79.899: ICD-10-CM

## 2020-11-13 DIAGNOSIS — C67.0: Primary | ICD-10-CM

## 2020-11-13 PROCEDURE — C1788 PORT, INDWELLING, IMP: HCPCS

## 2020-11-13 PROCEDURE — C1752 CATH,HEMODIALYSIS,SHORT-TERM: HCPCS

## 2020-11-13 PROCEDURE — 77001 FLUOROGUIDE FOR VEIN DEVICE: CPT

## 2020-11-13 PROCEDURE — 76937 US GUIDE VASCULAR ACCESS: CPT

## 2020-11-13 PROCEDURE — 36561 INSERT TUNNELED CV CATH: CPT

## 2020-11-13 NOTE — RADIOLOGY REPORT (SQ)
EXAM DESCRIPTION:  ADDITIONAL CHARGE



COMPLETE DATE/TIME:  11/13/2020 9:45 am



REASON FOR STUDY:  PORTACATH IN CATH LAB



FINDINGS:  Please see combined report for performance of procedure and radiologic supervision and int
erpretation.



IMPRESSION:  Please see combined report for performance of procedure and radiologic supervision and i
nterpretation.



Reading location - IP/workstation name: [email or web address]

## 2020-11-16 NOTE — XMS REPORT
Patient Summary Document

                          Created on:2020



Patient:ALVIN VITALE

Sex:Male

:1950

External Reference #:[id number]





Demographics







                          [place] [address]

 

                          Home Phone                [phone]

 

                          Work Phone                [phone]

 

                          Mobile Phone              [phone]

 

                          Preferred Language        [id number]

 

                          Marital Status            Unknown

 

                          Pentec[employer]alism Affiliation     Unknown

 

                          Race                      Unknown

 

                          Additional Race(s)        Unavailable



                                                    Other

 

                          Ethnic Group              Unknown









Author







                          Organization              [facility]

 

                          Address                   [facility] [address]



                                                    [place] [address]

 

                          Phone                     [phone]









Care Team Providers







                    Name                Role                Phone

 

                    Eamon Ledezma M.D. Attending Clinician Unavailable

 

                    Kayli Cowart        Attending Clinician Unavailable









Allergies, Adverse Reactions, Alerts







        Allergy Allergy Status  Severity Reaction(s) Onset   Inactive Treating C

alondra



        Name    Type                            Date    Date    Clinician 

 

        Asiprin Allergy to Active                                          



                Drug                                                    



                (Finding)                                                 







Medications







       Ordered Filled Start  Stop   Current Ordering Indication Dosage Frequency

 Signature

                          Comments                  Components



      Medication Medication Date  Date  Medication? Clinician                   

(SIG)       



      Name  Name                                                        

 

      Dexamethaso       2020       No                10mg        Dexamethas   

    



      ne Sodium       0-30                                      one Sodium      

 



      Phosphate       00:00:                                     Phosphate      

 



                  00                                                    

 

      Sodium       2020       No                40mL        Sodium       



      Chloride       0-30                                      Chloride       



                  00:00:                                                 



                  00                                                    

 

      Gemcitabine       2020       No                1760mg       Gemcitabin  

     



      HCl         0-30                                      e HCl       



                  00:00:                                                 



                  00                                                    

 

      Ondansetron       2020 2020- No                8mg         Ondansetro   

    



      HCl         0-19  10-19                               n HCl       



                  00:00: 00:00                                           



                  00    :00                                             

 

      Palonosetro       2020       No                .25mg       Palonosetr   

    



      n HCl       0-16                                      on HCl       



                  00:00:                                                 



                  00                                                    

 

      Dexamethaso       2020       No                10mg        Dexamethas   

    



      ne Sodium       0-16                                      one Sodium      

 



      Phosphate       00:00:                                     Phosphate      

 



                  00                                                    

 

      Sodium       2020       No                80mL        Sodium       



      Chloride       0-16                                      Chloride       



                  00:00:                                                 



                  00                                                    

 

      Fosaprepita       2020       No                150mg       Fosaprepit   

    



      nt          0-16                                      ant         



      Dimeglumine       00:00:                                     Dimeglumin   

    



                  00                                        e           

 

      Gemcitabine       2020       No                1770mg       Gemcitabin  

     



      HCl         0-16                                      e HCl       



                  00:00:                                                 



                  00                                                    

 

      Dexamethaso       2020       No                10mg        Dexamethas   

    



      ne Sodium       0-02                                      one Sodium      

 



      Phosphate       00:00:                                     Phosphate      

 



                  00                                                    

 

      Sodium       2020       No                90mL        Sodium       



      Chloride       0-02                                      Chloride       



                  00:00:                                                 



                  00                                                    

 

      Gemcitabine       2020-1       No                1750mg       Gemcitabin  

     



      HCl         0-02                                      e HCl       



                  00:00:                                                 



                  00                                                    

 

      Feraheme       2020-1       No                510mg       Feraheme       



                  0-02                                                  



                  00:00:                                                 



                  00                                                    

 

      Hydration       2020-0 2020- No                1000mL                   



      1000mL NS       9-28  10-19                                           



                  00:00: 00:00                                           



                  00    :00                                             

 

      Tylenol       2020-0 2020- No                650mg       Tylenol       



                                                             



                  00:00: 00:00                                           



                  00    :00                                             

 

      Carboplatin       2020-0       Yes               537mg                   



                                                                    



                  00:00:                                                 



                  00                                                    

 

      Dexamethaso       2020-0       Yes               10mg        Dexamethas   

    



      ne Sodium                                             one Sodium      

 



      Phosphate       00:00:                                     Phosphate      

 



                  00                                                    

 

      Gemcitabine       2020-0       Yes               1760mg       Gemcitabin  

     



      HCl                                               e HCl       



                  00:00:                                                 



                  00                                                    

 

      Megestrol       2020-0       Yes               10mL                    



      Acetate                                                         



                  00:00:                                                 



                  00                                                    

 

      Ondansetron       2020-0       Yes               1                       



      HCl                                                           



                  00:00:                                                 



                  00                                                    

 

      Promethazin       2020-0       Yes               1                       



      e HCl                                                         



                  00:00:                                                 



                  00                                                    

 

      Sodium       2020-0       Yes               40mL        Sodium       



      Chloride                                             Chloride       



                  00:00:                                                 



                  00                                                    

 

      Levaquin       2020-0       Yes               1                       



                                                                    



                  00:00:                                                 



                  00                                                    

 

      Fosaprepita       2020-0 2020- No                150mg       Fosaprepit   

    



      nt            10-16                               ant         



      Dimeglumine       00:00: 00:00                               Dimeglumin   

    



                  00    :00                                 e           

 

      Palonosetro       2020-0 2020- No                .25mg       Palonosetr   

    



      n HCl         10-16                               on HCl       



                  00:00: 00:00                                           



                  00    :00                                             

 

      Feraheme       2020-0 2020- No                510mg       Feraheme       



                  9-25  10-02                                           



                  00:00: 00:00                                           



                  00    :00                                             

 

      Rocephin       2020-0 2020- No                1g                      



                                                             



                  00:00: 00:00                                           



                  00    :00                                             

 

      amLODIPine                   Yes               1                       



      Besylate                                                             

 

      Cetirizine                   Yes               1                       



      HCl                                                               

 

      Fluticasone                   Yes               1                       



      Furoate                                                             







Problems







        Condition Condition Condition Status  Onset   Resolution Last    Treatin

g Comments



        Name    Details Category         Date    Date    Treatment Clinician 



                                                        Date            

 

        Nausea  Nausea  Diagnosis active  2020                            



                                        00:00:                          



                                        00                              

 

        Dehydration Dehydration Diagnosis active                              



                                        00:00:                          



                                        00                              

 

        Patient Patient Diagnosis active                            



        encounter encounter                                             



        status  status                  00:00:                          



                                        00                              

 

        Urinary Urinary Diagnosis active                            



        tract   tract                                               



        infectious infectious                 00:00:                          



        disease disease                 00                              

 

        Intestinal Intestinal Diagnosis active                                  



        malabsorpti malabsorpti                                                 



        on      on                                                      

 

        Iron    Iron    Diagnosis active                                  



        deficiency deficiency                                                 



        anemia  anemia                                                  

 

        Malignant Malignant Diagnosis active                                  



        tumor of tumor of                                                 



        trigone of trigone of                                                 



        urinary urinary                                                 



        bladder bladder                                                 







Procedures







                Procedure       Date / Time Performed Performing Clinician Devic

e

 

                OFFICE/OUTPATIENT VISIT NEW 2019 14:30:00                 

 

                colonoscopy     2015 00:00:00                 

 

                neck surgery                                    

 

                R leg nimco placement                                 

 

                Cystoscopy                                      

 

                TURBT                                           







Results







           Test Description Test Time  Test Comments Text Results Atomic Results

 Result 

Comments









                WBC             2020 09:30:00                 









                          Test Item    Value        Reference Range Comments









                WBC (test code = WBC) 2.2000          4.0000-10.0000  

 

                Lymphocytes % (test code = Lymphocytes %) 52.3000 %       22.400

0-43.6000 

 

                MID% (test code = MID%) 7.9000 %        .-  

 

                Neutrophils % (test code = Neutrophils %) 39.8000 %       48.900

0-69.9000 

 

                Lymphocytes (test code = Lymphocytes) 1.1000          1.2000-3.2

000   

 

                MID (test code = MID) 0.2000          0.1000-1.1000   

 

                Neutrophils (test code = Neutrophils) 0.9000          1.5000-6.7

000   

 

                RBC (test code = RBC) 3.4300          3.7000-4.9000   

 

                HGB (test code = HGB) 8.9000 g/dL     11.2000-18.0000 

 

                HCT (test code = HCT) 27.1000 %       34.0000-44.0000 

 

                MCV (test code = MCV) 79.0000 fL      80.0000-94.0000 

 

                MCH (test code = MCH) 26.1000 pg      27.0000-34.0000 

 

                MCHC (test code = MCHC) 33.1000 g/dL    31.5000-36.0000 

 

                RDW (test code = RDW) 19.2000         11.0000-18.0000 

 

                PLT (test code = PLT) 234.0000        140.0000-440.0000 

 

                MPV (test code = MPV) 7.2000 fL       6.8000-10.6000  



WBC2020-10-30 08:51:00





                Test Item       Value           Reference Range Comments

 

                WBC (test code = WBC) 7.3000          4.0000-10.0000  

 

                Lymphocytes % (test code = Lymphocytes %) 20.7000 %       22.400

0-43.6000 

 

                MID% (test code = MID%) 5.8000 %        .2000-11.  

 

                Neutrophils % (test code = Neutrophils %) 73.5000 %       48.900

0-69.9000 

 

                Lymphocytes (test code = Lymphocytes) 1.5000          1.2000-3.2

000   

 

                MID (test code = MID) 0.4000          0.1000-1.1000   

 

                Neutrophils (test code = Neutrophils) 5.4000          1.5000-6.7

000   

 

                RBC (test code = RBC) 4.1800          3.7000-4.9000   

 

                HGB (test code = HGB) 10.4000 g/dL    11.-18.0000 

 

                HCT (test code = HCT) 32.4000 %       34.0000-44.0000 

 

                MCV (test code = MCV) 77.4000 fL      80.0000-94.0000 

 

                MCH (test code = MCH) 24.9000 pg      27.0000-34.0000 

 

                MCHC (test code = MCHC) 32.2000 g/dL    31.5000-36.0000 

 

                RDW (test code = RDW) 18.5000         11.0000-18.0000 

 

                PLT (test code = PLT) 148.0000        140.0000-440.0000 

 

                MPV (test code = MPV) 7.5000 fL       6.8000-10.6000  



WBC2020-10-23 08:43:00





                Test Item       Value           Reference Range Comments

 

                WBC (test code = WBC) 2.0000          4.0000-10.0000  

 

                Lymphocytes % (test code = Lymphocytes %) 63.9000 %       22.400

0-43.6000 

 

                MID% (test code = MID%) 6.0000 %        1.-11.  

 

                Neutrophils % (test code = Neutrophils %) 30.1000 %       48.900

0-69.9000 

 

                Lymphocytes (test code = Lymphocytes) 1.2000          1.2000-3.2

000   

 

                MID (test code = MID) 0.2000          0.1000-1.1000   

 

                Neutrophils (test code = Neutrophils) 0.6000          1.5000-6.7

000   

 

                RBC (test code = RBC) 4.0800          3.7000-4.9000   

 

                HGB (test code = HGB) 10.6000 g/dL    11.2000-18.0000 

 

                HCT (test code = HCT) 31.3000 %       34.0000-44.0000 

 

                MCV (test code = MCV) 76.8000 fL      80.0000-94.0000 

 

                MCH (test code = MCH) 26.1000 pg      27.0000-34.0000 

 

                MCHC (test code = MCHC) 33.9000 g/dL    31.5000-36.0000 

 

                RDW (test code = RDW) 18.0000         11.0000-18.0000 

 

                PLT (test code = PLT) 501.0000        140.0000-440.0000 

 

                MPV (test code = MPV) 6.9000 fL       6.8000-10.6000  



RDW2020-10-23 08:43:00





                Test Item       Value           Reference Range Comments

 

                RDW (test code = RDW) 18.0000         11.0000-18.0000 

 

                MID% (test code = MID%) 6.0000 %        1.2000-11.2000  

 

                Neutrophils % (test code = Neutrophils %) 30.1000 %       48.900

0-69.9000 

 

                HCT (test code = HCT) 31.3000 %       34.0000-44.0000 

 

                Lymphocytes % (test code = Lymphocytes %) 63.9000 %       22.400

0-43.6000 

 

                MCHC (test code = MCHC) 33.9000 g/dL    31.5000-36.0000 

 

                HGB (test code = HGB) 10.6000 g/dL    11.2000-18.0000 

 

                MCH (test code = MCH) 26.1000 pg      27.0000-34.0000 

 

                MPV (test code = MPV) 6.9000 fL       6.8000-10.6000  

 

                MCV (test code = MCV) 76.8000 fL      80.0000-94.0000 

 

                PLT (test code = PLT) 501.0000        140.0000-440.0000 

 

                WBC (test code = WBC) 2.0000          4.0000-10.0000  

 

                RBC (test code = RBC) 4.0800          3.7000-4.9000   

 

                Lymphocytes (test code = Lymphocytes) 1.2000          1.2000-3.2

000   

 

                MID (test code = MID) 0.2000          0.1000-1.1000   

 

                Neutrophils (test code = Neutrophils) 0.6000          1.5000-6.7

000   



Urine Culture Status2020-10-20 13:09:00





                Test Item       Value           Reference Range Comments

 

                Urine Culture Status (test code = Urine Culture Final report    

                



                Status)                                         

 

                Urine Culture Result 1 (test code = Urine No growth             

          



                Culture Result 1)                                 



Urine Culture Result 12020-10-20 13:09:00





                Test Item       Value           Reference Range Comments

 

                Urine Culture Result 1 (test code = Urine No growth             

          



                Culture Result 1)                                 

 

                Urine Culture Status (test code = Urine Culture Final report    

                



                Status)                                         



U Color2020-10-19 10:41:00





                Test Item       Value           Reference Range Comments

 

                U Color (test code = U Color) Yellow                          

 

                U Urobilinogen (test code = U Urobilinogen) 0.2                 

            

 

                U Specific Gravity (test code = U Specific Gravity) 1.0200      

                    

 

                U Appearance (test code = U Appearance) Cloudy                  

        

 

                U Glucose (test code = U Glucose) Negative                      

  

 

                U Bilirubin (test code = U Bilirubin) Negative                  

      

 

                U Ketones (test code = U Ketones) Negative                      

  

 

                U Blood (test code = U Blood) Negative                        

 

                U pH (test code = U pH) 7.5000          5.0000-8.0000   

 

                U Protein (test code = U Protein) Negative                      

  

 

                U Nitrite (test code = U Nitrite) Negative                      

  

 

                U Leuk Esterase (test code = U Leuk Esterase) Small             

              



U pH2020-10-19 10:41:00





                Test Item       Value           Reference Range Comments

 

                U pH (test code = U pH) 7.5000          5.0000-8.0000   

 

                U Appearance (test code = U Appearance) Cloudy                  

        

 

                U Protein (test code = U Protein) Negative                      

  

 

                U Specific Gravity (test code = U Specific Gravity) 1.0200      

                    

 

                U Urobilinogen (test code = U Urobilinogen) 0.2                 

            

 

                U Nitrite (test code = U Nitrite) Negative                      

  

 

                U Ketones (test code = U Ketones) Negative                      

  

 

                U Leuk Esterase (test code = U Leuk Esterase) Small             

              

 

                U Color (test code = U Color) Yellow                          

 

                U Glucose (test code = U Glucose) Negative                      

  

 

                U Bilirubin (test code = U Bilirubin) Negative                  

      

 

                U Blood (test code = U Blood) Negative                        



Creatinine2020-10-16 10:21:17





                Test Item       Value           Reference Range Comments

 

                Creatinine (test code = Creatinine) 0.8000 mg/dL    0.5000-1.500

0   

 

                Cr Clearance (Est) (test code = Cr Clearance 82.1900         85.

0000-125.0000 



                (Est))                                          



Cr Clearance (Est)2020-10-16 10:21:17





                Test Item       Value           Reference Range Comments

 

                Cr Clearance (Est) (test code = Cr Clearance 82.1900         85.

0000-125.0000 



                (Est))                                          

 

                Creatinine (test code = Creatinine) 0.8000 mg/dL    0.5000-1.500

0   



WBC2020-10-16 09:09:00





                Test Item       Value           Reference Range Comments

 

                WBC (test code = WBC) 3.4000          4.0000-10.0000  

 

                Lymphocytes % (test code = Lymphocytes %) 35.7000 %       22.400

0-43.6000 

 

                MID% (test code = MID%) 8.6000 %        1.2000-11.2000  

 

                Neutrophils % (test code = Neutrophils %) 55.7000 %       48.900

0-69.9000 

 

                Lymphocytes (test code = Lymphocytes) 1.2000          1.2000-3.2

000   

 

                MID (test code = MID) 0.3000          0.1000-1.1000   

 

                Neutrophils (test code = Neutrophils) 1.9000          1.5000-6.7

000   

 

                RBC (test code = RBC) 4.0500          3.7000-4.9000   

 

                HGB (test code = HGB) 10.2000 g/dL    11.2000-18.0000 

 

                HCT (test code = HCT) 31.8000 %       34.0000-44.0000 

 

                MCV (test code = MCV) 78.4000 fL      80.0000-94.0000 

 

                MCH (test code = MCH) 25.2000 pg      27.0000-34.0000 

 

                MCHC (test code = MCHC) 32.1000 g/dL    31.5000-36.0000 

 

                RDW (test code = RDW) 18.5000         11.0000-18.0000 

 

                PLT (test code = PLT) 633.0000        140.0000-440.0000 

 

                MPV (test code = MPV) 7.0000 fL       6.8000-10.6000  



[id number] 09:09:00





                Test Item       Value           Reference Range Comments

 

                Creatinine (test code = Creatinine) 0.8000 mg/dL    0.5000-1.200

0   

 

                Cr Clearance (Est) (test code = Cr 82.1900         85.0000-125.0

000 



                Clearance (Est))                                 

 

                Glucose (test code = Glucose) 106.0000 mg/dL  70.0000-118.0000 

 

                BUN (test code = BUN) 7.0000 mg/dL    7.0000-22.0000  

 

                Sodium (test code = Sodium) 139.0000 mmol/L 128.0000-145.0000 

 

                Potassium (test code = Potassium) 3.6000 mmol/L   3.6000-5.1000 

  

 

                Chloride (test code = Chloride) 104.0000 mmol/L 96.0000-108.0000

 

 

                CO2 (test code = CO2) 31.0000 mmol/L  18.0000-33.0000 

 

                Calcium (test code = Calcium) 8.8600 mg/dL    8.0000-10.3000  

 

                Alkaline Phosphatase (test code = Alkaline 96.0000         42.00

.0000 



                Phosphatase)                                    

 

                ALT (SGPT) (test code = ALT (SGPT)) 6.0000          10.0000-47.0

000 

 

                AST (SGOT) (test code = AST (SGOT)) 11.0000         11.0000-37.0

000 

 

                Bilirubin, Total (test code = Bilirubin, 0.3000 mg/dL    0.0000-

1.6000   



                Total)                                          

 

                Albumin (test code = Albumin) 3.9000 g/dL     3.5000-5.5000   

 

                Protein, Total (test code = Protein, 6.2000 g/dL     6.4000-8.10

00   



                Total)                                          

 

                eGFR -American (test code = eGFR 116.0000        60.0000-

200.0000 



                -American)                                 

 

                eGFR Non--American (test code = 96.0000         60.0000-2

00.0000 



                eGFR Non--American)                                 



[id number] 09:09:00





                Test Item       Value           Reference Range Comments

 

                RDW (test code = RDW) 18.5000         11.0000-18.0000 

 

                Lymphocytes % (test code = Lymphocytes %) 35.7000 %       22.400

0-43.6000 

 

                HCT (test code = HCT) 31.8000 %       34.0000-44.0000 

 

                MID% (test code = MID%) 8.6000 %        1.2000-11.2000  

 

                Neutrophils % (test code = Neutrophils %) 55.7000 %       48.900

0-69.9000 

 

                HGB (test code = HGB) 10.2000 g/dL    11.2000-18.0000 

 

                MCHC (test code = MCHC) 32.1000 g/dL    31.5000-36.0000 

 

                MCV (test code = MCV) 78.4000 fL      80.0000-94.0000 

 

                MPV (test code = MPV) 7.0000 fL       6.8000-10.6000  

 

                MCH (test code = MCH) 25.2000 pg      27.0000-34.0000 

 

                Neutrophils (test code = Neutrophils) 1.9000          1.5000-6.7

000   

 

                Lymphocytes (test code = Lymphocytes) 1.2000          1.2000-3.2

000   

 

                MID (test code = MID) 0.3000          0.1000-1.1000   

 

                RBC (test code = RBC) 4.0500          3.7000-4.9000   

 

                WBC (test code = WBC) 3.4000          4.0000-10.0000  

 

                PLT (test code = PLT) 633.0000        140.0000-440.0000 



[id number] 09:09:00





                Test Item       Value           Reference Range Comments

 

                Chloride (test code = Chloride) 104.0000 mmol/L 96.0000-108.0000

 

 

                ALT (SGPT) (test code = ALT (SGPT)) 6.0000          10.0000-47.0

000 

 

                AST (SGOT) (test code = AST (SGOT)) 11.0000         11.0000-37.0

000 

 

                Alkaline Phosphatase (test code = Alkaline 96.0000         42.00

.0000 



                Phosphatase)                                    

 

                Protein, Total (test code = Protein, 6.2000 g/dL     6.4000-8.10

00   



                Total)                                          

 

                Albumin (test code = Albumin) 3.9000 g/dL     3.5000-5.5000   

 

                Bilirubin, Total (test code = Bilirubin, 0.3000 mg/dL    0.0000-

1.6000   



                Total)                                          

 

                BUN (test code = BUN) 7.0000 mg/dL    7.0000-22.0000  

 

                Calcium (test code = Calcium) 8.8600 mg/dL    8.0000-10.3000  

 

                Glucose (test code = Glucose) 106.0000 mg/dL  70.0000-118.0000 

 

                eGFR Non--American (test code = 96.0000         60.0000-2

00.0000 



                eGFR Non--American)                                 

 

                CO2 (test code = CO2) 31.0000 mmol/L  18.0000-33.0000 

 

                Potassium (test code = Potassium) 3.6000 mmol/L   3.6000-5.1000 

  

 

                Cr Clearance (Est) (test code = Cr 82.1900         85.0000-125.0

000 



                Clearance (Est))                                 

 

                Sodium (test code = Sodium) 139.0000 mmol/L 128.0000-145.0000 

 

                eGFR -American (test code = eGFR 116.0000        60.0000-

200.0000 



                -American)                                 

 

                Creatinine (test code = Creatinine) 0.8000 mg/dL    0.5000-1.200

0   



[id number] 09:52:00





                Test Item       Value           Reference Range Comments

 

                WBC (test code = WBC) 1.9000          4.0000-10.0000  

 

                Lymphocytes % (test code = Lymphocytes %) 66.2000 %       22.400

0-43.6000 

 

                MID% (test code = MID%) 3.7000 %        1.2000-11.2000  

 

                Neutrophils % (test code = Neutrophils %) 30.1000 %       48.900

0-69.9000 

 

                Lymphocytes (test code = Lymphocytes) 1.3000          1.2000-3.2

000   

 

                MID (test code = MID) 0.1000          0.1000-1.1000   

 

                Neutrophils (test code = Neutrophils) 0.5000          1.5000-6.7

000   

 

                RBC (test code = RBC) 3.6500          3.7000-4.9000   

 

                HGB (test code = HGB) 9.1000 g/dL     .-18.0000 

 

                HCT (test code = HCT) 27.6000 %       34.0000-44.0000 

 

                MCV (test code = MCV) 75.5000 fL      80.0000-94.0000 

 

                MCH (test code = MCH) 24.9000 pg      27.0000-34.0000 

 

                MCHC (test code = MCHC) 33.0000 g/dL    31.5000-36.0000 

 

                RDW (test code = RDW)          11.0000-18.0000 

 

                PLT (test code = PLT) 74.0000         140.0000-440.0000 

 

                MPV (test code = MPV) 7.0000 fL       6.8000-10.6000  



[id number] 09:52:00





                Test Item       Value           Reference Range Comments

 

                PLT (test code = PLT) 74.0000         140.0000-440.0000 

 

                WBC (test code = WBC) 1.9000          4.0000-10.0000  

 

                RBC (test code = RBC) 3.6500          3.7000-4.9000   

 

                Lymphocytes (test code = Lymphocytes) 1.3000          1.2000-3.2

000   

 

                Neutrophils (test code = Neutrophils) 0.5000          1.5000-6.7

000   

 

                MID (test code = MID) 0.1000          0.1000-1.1000   

 

                MCH (test code = MCH) 24.9000 pg      27.0000-34.0000 

 

                MCV (test code = MCV) 75.5000 fL      80.0000-94.0000 

 

                MPV (test code = MPV) 7.0000 fL       6.8000-10.6000  

 

                MCHC (test code = MCHC) 33.0000 g/dL    31.5000-36.0000 

 

                HGB (test code = HGB) 9.1000 g/dL     .-18.0000 

 

                MID% (test code = MID%) 3.7000 %        .-  

 

                Neutrophils % (test code = Neutrophils %) 30.1000 %       48.900

0-69.9000 

 

                HCT (test code = HCT) 27.6000 %       34.0000-44.0000 

 

                Lymphocytes % (test code = Lymphocytes %) 66.2000 %       22.400

0-43.6000 

 

                RDW (test code = RDW) 16.         11.0000-18.0000 



[id number] 11:56:00





                Test Item       Value           Reference Range Comments

 

                WBC (test code = WBC) 2.9000          4.0000-10.0000  

 

                Lymphocytes % (test code = Lymphocytes %) 49.0000 %       22.400

0-43.6000 

 

                MID% (test code = MID%) 6.6000 %        .-11  

 

                Neutrophils % (test code = Neutrophils %) 44.4000 %       48.900

0-69.9000 

 

                Lymphocytes (test code = Lymphocytes) 1.4000          1.2000-3.2

000   

 

                MID (test code = MID) 0.2000          0.1000-1.1000   

 

                Neutrophils (test code = Neutrophils) 1.3000          1.5000-6.7

000   

 

                RBC (test code = RBC) 4.4300          3.7000-4.9000   

 

                HGB (test code = HGB) 10.8000 g/dL    11.-18.0000 

 

                HCT (test code = HCT) 34.1000 %       34.0000-44.0000 

 

                MCV (test code = MCV) 77.1000 fL      80.0000-94.0000 

 

                MCH (test code = MCH) 24.4000 pg      27.0000-34.0000 

 

                MCHC (test code = MCHC) 31.6000 g/dL    31.5000-36.0000 

 

                RDW (test code = RDW) 17.7000         11.0000-18.0000 

 

                PLT (test code = PLT) 277.0000        140.0000-440.0000 

 

                MPV (test code = MPV) 7.2000 fL       6.8000-10.6000  



[id number] 11:56:00





                Test Item       Value           Reference Range Comments

 

                RDW (test code = RDW) 17.7000         11.0000-18.0000 

 

                Lymphocytes % (test code = Lymphocytes %) 49.0000 %       22.400

0-43.6000 

 

                HCT (test code = HCT) 34.1000 %       34.0000-44.0000 

 

                Neutrophils % (test code = Neutrophils %) 44.4000 %       48.900

0-69.9000 

 

                MID% (test code = MID%) 6.6000 %        1.2000-11.2000  

 

                MCHC (test code = MCHC) 31.6000 g/dL    31.5000-36.0000 

 

                MPV (test code = MPV) 7.2000 fL       6.8000-10.6000  

 

                MCV (test code = MCV) 77.1000 fL      80.0000-94.0000 

 

                MCH (test code = MCH) 24.4000 pg      27.0000-34.0000 

 

                RBC (test code = RBC) 4.4300          3.7000-4.9000   

 

                Neutrophils (test code = Neutrophils) 1.3000          1.5000-6.7

000   

 

                HGB (test code = HGB) 10.8000 g/dL    11.2000-18.0000 

 

                Lymphocytes (test code = Lymphocytes) 1.4000          1.2000-3.2

000   

 

                MID (test code = MID) 0.2000          0.1000-1.1000   

 

                WBC (test code = WBC) 2.9000          4.0000-10.0000  

 

                PLT (test code = PLT) 277.0000        140.0000-440.0000 



Occult Blood # 11:59:24





                Test Item       Value           Reference Range Comments

 

                Occult Blood #1 (test code = Occult Blood #1) POSITIVE          

              

 

                Occult Blood #2 (test code = Occult Blood #2) POSITIVE          

              

 

                Occult Blood #3 (test code = Occult Blood #3) POSITIVE          

              



Urine Culture Result  11:35:00





                Test Item       Value           Reference Range Comments

 

                Urine Culture Result 1 (test code = Urine No growth             

          



                Culture Result 1)                                 

 

                Urine Culture Status (test code = Urine Culture Final report    

                



                Status)                                         



Urine Culture [id number] 11:35:00





                Test Item       Value           Reference Range Comments

 

                Urine Culture Status (test code = Urine Culture Final report    

                



                Status)                                         

 

                Urine Culture Result 1 (test code = Urine No growth             

          



                Culture Result 1)                                 



U Specific [id number] 10:02:00





                Test Item       Value           Reference Range Comments

 

                U Specific Gravity (test code = U Specific Gravity) 1.0250      

                    

 

                U Protein (test code = U Protein) >=300                         

  

 

                U Appearance (test code = U Appearance) Turbid                  

        

 

                U Bilirubin (test code = U Bilirubin) Negative                  

      

 

                U Blood (test code = U Blood) Large                           

 

                U Color (test code = U Color) Mylene                           

 

                U Glucose (test code = U Glucose) Negative                      

  

 

                U Ketones (test code = U Ketones) Trace                         

  

 

                U Leuk Esterase (test code = U Leuk Esterase) Large             

              

 

                U Nitrite (test code = U Nitrite) Negative                      

  

 

                U Urobilinogen (test code = U Urobilinogen) 1                   

            

 

                U pH (test code = U pH) 6.5000          5.0000-8.0000   



[id number] 10:02:00





                Test Item       Value           Reference Range Comments

 

                U Color (test code = U Color) Mylene                           

 

                U Urobilinogen (test code = U Urobilinogen) 1                   

            

 

                U Specific Gravity (test code = U Specific Gravity) 1.0250      

                    

 

                U Appearance (test code = U Appearance) Turbid                  

        

 

                U Glucose (test code = U Glucose) Negative                      

  

 

                U Bilirubin (test code = U Bilirubin) Negative                  

      

 

                U Ketones (test code = U Ketones) Trace                         

  

 

                U Blood (test code = U Blood) Large                           

 

                U pH (test code = U pH) 6.5000          5.0000-8.0000   

 

                U Protein (test code = U Protein) >=300                         

  

 

                U Nitrite (test code = U Nitrite) Negative                      

  

 

                U Leuk Esterase (test code = U Leuk Esterase) Large             

              



[id number] 08:42:00





                Test Item       Value           Reference Range Comments

 

                HCT (test code = HCT) 38.6000 %       34.0000-44.0000 

 

                MID% (test code = MID%) 4.2000 %        1.2000-11.2000  

 

                Lymphocytes % (test code = Lymphocytes %) 12.6000 %       22.400

0-43.6000 

 

                Neutrophils % (test code = Neutrophils %) 83.2000 %       48.900

0-69.9000 

 

                RDW (test code = RDW) 18.0000         11.0000-18.0000 

 

                MCH (test code = MCH) 25.4000 pg      27.0000-34.0000 

 

                MCV (test code = MCV) 77.4000 fL      80.0000-94.0000 

 

                MPV (test code = MPV) 7.3000 fL       6.8000-10.6000  

 

                HGB (test code = HGB) 12.7000 g/dL    11.2000-18.0000 

 

                MCHC (test code = MCHC) 32.8000 g/dL    31.5000-36.0000 

 

                WBC (test code = WBC) 10.7000         4.0000-10.0000  

 

                PLT (test code = PLT) 428.0000        140.0000-440.0000 

 

                MID (test code = MID) 0.5000          0.1000-1.1000   

 

                Neutrophils (test code = Neutrophils) 8.9000          1.5000-6.7

000   

 

                Lymphocytes (test code = Lymphocytes) 1.3000          1.2000-3.2

000   

 

                RBC (test code = RBC) 4.9900          3.7000-4.9000   



Alkaline [id number] 08:42:00





                Test Item       Value           Reference Range Comments

 

                Alkaline Phosphatase (test code = Alkaline 104.0000        42.00

.0000 



                Phosphatase)                                    

 

                ALT (SGPT) (test code = ALT (SGPT)) 5.0000          10.0000-47.0

000 

 

                Sodium (test code = Sodium) 139.0000 mmol/L 128.0000-145.0000 

 

                eGFR -American (test code = eGFR 80.0000         60.0000-

200.0000 



                -American)                                 

 

                eGFR Non--American (test code = 66.0000         60.0000-2

00.0000 



                eGFR Non--American)                                 

 

                Potassium (test code = Potassium) 3.6000 mmol/L   3.6000-5.1000 

  

 

                AST (SGOT) (test code = AST (SGOT)) 14.0000         11.0000-37.0

000 

 

                Chloride (test code = Chloride) 103.0000 mmol/L 96.0000-108.0000

 

 

                CO2 (test code = CO2) 30.0000 mmol/L  18.0000-33.0000 

 

                Creatinine (test code = Creatinine) 1.1000 mg/dL    0.5000-1.200

0   

 

                Glucose (test code = Glucose) 116.0000 mg/dL  70.0000-118.0000 

 

                BUN (test code = BUN) 18.0000 mg/dL   7.0000-22.0000  

 

                Calcium (test code = Calcium) 9.2000 mg/dL    8.0000-10.3000  

 

                Bilirubin, Total (test code = Bilirubin, 0.6000 mg/dL    0.0000-

1.6000   



                Total)                                          

 

                Albumin (test code = Albumin) 4.2000 g/dL     3.5000-5.5000   

 

                Protein, Total (test code = Protein, 7.2000 g/dL     6.4000-8.10

00   



                Total)                                          

 

                Cr Clearance (Est) (test code = Cr 57.6600         85.0000-125.0

000 



                Clearance (Est))                                 



[id number] 08:42:00





                Test Item       Value           Reference Range Comments

 

                WBC (test code = WBC) 10.7000         4.0000-10.0000  

 

                Lymphocytes % (test code = Lymphocytes %) 12.6000 %       22.400

0-43.6000 

 

                MID% (test code = MID%) 4.2000 %        1.2000-11.2000  

 

                Neutrophils % (test code = Neutrophils %) 83.2000 %       48.900

0-69.9000 

 

                Lymphocytes (test code = Lymphocytes) 1.3000          1.2000-3.2

000   

 

                MID (test code = MID) 0.5000          0.1000-1.1000   

 

                Neutrophils (test code = Neutrophils) 8.9000          1.5000-6.7

000   

 

                RBC (test code = RBC) 4.9900          3.7000-4.9000   

 

                HGB (test code = HGB) 12.7000 g/dL    11.2000-18.0000 

 

                HCT (test code = HCT) 38.6000 %       34.0000-44.0000 

 

                MCV (test code = MCV) 77.4000 fL      80.0000-94.0000 

 

                MCH (test code = MCH) 25.4000 pg      27.0000-34.0000 

 

                MCHC (test code = MCHC) 32.8000 g/dL    31.5000-36.0000 

 

                RDW (test code = RDW) 18.0000         11.0000-18.0000 

 

                PLT (test code = PLT) 428.0000        140.0000-440.0000 

 

                MPV (test code = MPV) 7.3000 fL       6.8000-10.6000  



[id number] 08:42:00





                Test Item       Value           Reference Range Comments

 

                Creatinine (test code = Creatinine) 1.1000 mg/dL    0.5000-1.200

0   

 

                Cr Clearance (Est) (test code = Cr 57.6600         85.0000-125.0

000 



                Clearance (Est))                                 

 

                Glucose (test code = Glucose) 116.0000 mg/dL  70.0000-118.0000 

 

                BUN (test code = BUN) 18.0000 mg/dL   7.0000-22.0000  

 

                Sodium (test code = Sodium) 139.0000 mmol/L 128.0000-145.0000 

 

                Potassium (test code = Potassium) 3.6000 mmol/L   3.6000-5.1000 

  

 

                Chloride (test code = Chloride) 103.0000 mmol/L 96.0000-108.0000

 

 

                CO2 (test code = CO2) 30.0000 mmol/L  18.0000-33.0000 

 

                Calcium (test code = Calcium) 9.2000 mg/dL    8.0000-10.3000  

 

                Alkaline Phosphatase (test code = Alkaline 104.0000        42.00

.0000 



                Phosphatase)                                    

 

                ALT (SGPT) (test code = ALT (SGPT)) 5.0000          10.0000-47.0

000 

 

                AST (SGOT) (test code = AST (SGOT)) 14.0000         11.0000-37.0

000 

 

                Bilirubin, Total (test code = Bilirubin, 0.6000 mg/dL    0.0000-

1.6000   



                Total)                                          

 

                Albumin (test code = Albumin) 4.2000 g/dL     3.5000-5.5000   

 

                Protein, Total (test code = Protein, 7.2000 g/dL     6.4000-8.10

00   



                Total)                                          

 

                eGFR -American (test code = eGFR 80.0000         60.0000-

200.0000 



                -American)                                 

 

                eGFR Non--American (test code = 66.0000         60.0000-2

00.0000 



                eGFR Non--American)                                 



[id number] 11:19:00





                Test Item       Value           Reference Range Comments

 

                TIBC (test code = TIBC) 290.0000        250.0000-450.0000 

 

                UIBC (test code = UIBC) 260.0000        111.0000-343.0000 

 

                Iron, Total (test code = Iron, Total) 30.0000         38.0000-16

9.0000 

 

                Protein, Total (test code = Protein, 7.1000 g/dL     6.0000-8.50

00   



                Total)                                          

 

                Globulin (test code = Globulin) 3.1000 g/dL     1.5000-4.5000   

 

                Albumin (test code = Albumin) 4.0000 g/dL     3.8000-4.8000   

 

                Bilirubin, Total (test code = Bilirubin, 0.2000 mg/dL    0.0000-

1.2000   



                Total)                                          

 

                Calcium (test code = Calcium) 9.3000 mg/dL    8.6000-10.2000  

 

                BUN (test code = BUN) 12.0000 mg/dL   8.0000-27.0000  

 

                Glucose (test code = Glucose) 97.0000 mg/dL   65.0000-99.0000 

 

                Creatinine (test code = Creatinine) 1.1400 mg/dL    0.7600-1.270

0   

 

                Vitamin B12 (test code = Vitamin B12) 507.0000 pg/mL  232.0000-1

245.0000 

 

                Cr Clearance (Est) (test code = Cr 56.1100         85.0000-125.0

000 



                Clearance (Est))                                 

 

                Ferritin (test code = Ferritin) 225.0000 ng/mL  30.0000-400.0000

 

 

                Folate (test code = Folate) 6.7000 ng/mL                    

 

                Chloride (test code = Chloride) 104.0000 mmol/L 96.0000-106.0000

 

 

                CO2 (test code = CO2) 25.0000 mmol/L  20.0000-29.0000 

 

                Potassium (test code = Potassium) 3.9000 mmol/L   3.5000-5.2000 

  

 

                Sodium (test code = Sodium) 145.0000 mmol/L 134.0000-144.0000 

 

                Alkaline Phosphatase (test code = 112.0000        39.0000-117.00

00 



                Alkaline Phosphatase)                                 

 

                ALT (SGPT) (test code = ALT (SGPT)) 4.0000          0.0000-44.00

00  

 

                AST (SGOT) (test code = AST (SGOT)) 20.0000         0.0000-40.00

00  

 

                % Iron Saturation (test code = % Iron 10.0000 %       15.0000-55

.0000 



                Saturation)                                     

 

                eGFR -American (test code = eGFR 75.0000                 

        



                -American)                                 

 

                A/G Ratio (test code = A/G Ratio) 1.3000          1.2000-2.2000 

  

 

                BUN/Creat Ratio (test code = BUN/Creat 11.0000         10.0000-2

4.0000 



                Ratio)                                          

 

                eGFR Non--American (test code = 65.0000                  

       



                eGFR Non--American)                                 



[id number] 11:19:00





                Test Item       Value           Reference Range Comments

 

                Creatinine (test code = Creatinine) 1.1400 mg/dL    0.7600-1.270

0   

 

                Cr Clearance (Est) (test code = Cr 56.1100         85.0000-125.0

000 



                Clearance (Est))                                 

 

                Glucose (test code = Glucose) 97.0000 mg/dL   65.0000-99.0000 

 

                BUN (test code = BUN) 12.0000 mg/dL   8.0000-27.0000  

 

                eGFR Non--American (test code = 65.0000                  

       



                eGFR Non--American)                                 

 

                eGFR -American (test code = eGFR 75.0000                 

        



                -American)                                 

 

                BUN/Creat Ratio (test code = BUN/Creat 11.0000         10.0000-2

4.0000 



                Ratio)                                          

 

                Sodium (test code = Sodium) 145.0000 mmol/L 134.0000-144.0000 

 

                Potassium (test code = Potassium) 3.9000 mmol/L   3.5000-5.2000 

  

 

                Chloride (test code = Chloride) 104.0000 mmol/L 96.0000-106.0000

 

 

                CO2 (test code = CO2) 25.0000 mmol/L  20.0000-29.0000 

 

                Calcium (test code = Calcium) 9.3000 mg/dL    8.6000-10.2000  

 

                Protein, Total (test code = Protein, 7.1000 g/dL     6.0000-8.50

00   



                Total)                                          

 

                Albumin (test code = Albumin) 4.0000 g/dL     3.8000-4.8000   

 

                Globulin (test code = Globulin) 3.1000 g/dL     1.5000-4.5000   

 

                A/G Ratio (test code = A/G Ratio) 1.3000          1.2000-2.2000 

  

 

                Bilirubin, Total (test code = Bilirubin, 0.2000 mg/dL    0.0000-

1.2000   



                Total)                                          

 

                Alkaline Phosphatase (test code = 112.0000        39.0000-117.00

00 



                Alkaline Phosphatase)                                 

 

                AST (SGOT) (test code = AST (SGOT)) 20.0000         0.0000-40.00

00  

 

                ALT (SGPT) (test code = ALT (SGPT)) 4.0000          0.0000-44.00

00  

 

                Iron, Total (test code = Iron, Total) 30.0000         38.0000-16

9.0000 

 

                TIBC (test code = TIBC) 290.0000        250.0000-450.0000 

 

                UIBC (test code = UIBC) 260.0000        111.0000-343.0000 

 

                % Iron Saturation (test code = % Iron 10.0000 %       15.0000-55

.0000 



                Saturation)                                     

 

                Vitamin B12 (test code = Vitamin B12) 507.0000 pg/mL  232.0000-1

245.0000 

 

                Folate (test code = Folate) 6.7000 ng/mL                    

 

                Ferritin (test code = Ferritin) 225.0000 ng/mL  30.0000-400.0000

 



[id number] 16:47:00





                Test Item       Value           Reference Range Comments

 

                RDW (test code = RDW) 18.6000         11.0000-18.0000 

 

                HCT (test code = HCT) 38.0000 %       34.0000-44.0000 

 

                Lymphocytes % (test code = Lymphocytes %) 19.2000 %       22.400

0-43.6000 

 

                MID% (test code = MID%) 5.6000 %        1.2000-11.2000  

 

                Neutrophils % (test code = Neutrophils %) 75.2000 %       48.900

0-69.9000 

 

                MCH (test code = MCH) 24.5000 pg      27.0000-34.0000 

 

                MCV (test code = MCV) 76.6000 fL      80.0000-94.0000 

 

                MPV (test code = MPV) 7.0000 fL       6.8000-10.6000  

 

                MCHC (test code = MCHC) 31.9000 g/dL    31.5000-36.0000 

 

                HGB (test code = HGB) 12.1000 g/dL    11.2000-18.0000 

 

                Lymphocytes (test code = Lymphocytes) 1.9000          1.2000-3.2

000   

 

                MID (test code = MID) 0.6000          0.1000-1.1000   

 

                Neutrophils (test code = Neutrophils) 7.6000          1.5000-6.7

000   

 

                PLT (test code = PLT) 351.0000        140.0000-440.0000 

 

                WBC (test code = WBC) 10.1000         4.0000-10.0000  

 

                RBC (test code = RBC) 4.9600          3.7000-4.9000   



[id number] 16:47:00





                Test Item       Value           Reference Range Comments

 

                WBC (test code = WBC) 10.1000         4.0000-10.0000  

 

                Lymphocytes % (test code = Lymphocytes %) 19.2000 %       22.400

0-43.6000 

 

                MID% (test code = MID%) 5.6000 %        1.2000-11.2000  

 

                Neutrophils % (test code = Neutrophils %) 75.2000 %       48.900

0-69.9000 

 

                Lymphocytes (test code = Lymphocytes) 1.9000          1.2000-3.2

000   

 

                MID (test code = MID) 0.6000          0.1000-1.1000   

 

                Neutrophils (test code = Neutrophils) 7.6000          1.5000-6.7

000   

 

                RBC (test code = RBC) 4.9600          3.7000-4.9000   

 

                HGB (test code = HGB) 12.1000 g/dL    11.2000-18.0000 

 

                HCT (test code = HCT) 38.0000 %       34.0000-44.0000 

 

                MCV (test code = MCV) 76.6000 fL      80.0000-94.0000 

 

                MCH (test code = MCH) 24.5000 pg      27.0000-34.0000 

 

                MCHC (test code = MCHC) 31.9000 g/dL    31.5000-36.0000 

 

                RDW (test code = RDW) 18.6000         11.0000-18.0000 

 

                PLT (test code = PLT) 351.0000        140.0000-440.0000 

 

                MPV (test code = MPV) 7.0000 fL       6.8000-10.6000  







Assessments







                Condition Name  Status          Diagnosis Date  Treating Clinici

an

 

                Acute maxillary sinusitis, unspecified Active                   

       

 

                Anesthesia of skin Active                          

 

                Pain in unspecified joint Active                          

 

                Dizziness and giddiness Active                          







Encounters







        Start   End     Encounter Admission Attending Care    Care    Encounter



        Date/Time Date/Time Type    Type    Clinicians Facility Department ID

 

        2020 Outpatient         Li Ledezma

rn [id number]



        00:00:00 00:00:00                 [facility] 



                                                Oncology Oncology 



                                                [facility]  

 

        2020 Outpatient                 Li Jefferson

n [id number]



        00:00:00 00:00:00                         [facility] 



                                                Oncology Oncology 



                                                [facility]  

 

        2020 Outpatient                 Li francois [id number]



        00:00:00 00:00:00                         [facility] 



                                                Oncology Oncology 



                                                [facility]  

 

        2020 Outpatient                 Li francois [id number]



        00:00:00 00:00:00                         [facility] 



                                                Oncology Oncology 



                                                [facility]  

 

        2020-10-30 2020-10-30 Outpatient         Li Ledezma

rn [id number]



        00:00:00 00:00:00                 [facility] 



                                                Oncology Oncology 



                                                [facility]  

 

        2020-10-29 2020-10-29 Outpatient                 Li Jefferson

n [id number]



        00:00:00 00:00:00                         [facility] 



                                                Oncology Oncology 



                                                [facility]  

 

        2020-10-23 2020-10-23 Outpatient         Li Ledezma

rn [id number]



        00:00:00 00:00:00                 [facility] 



                                                Oncology Oncology 



                                                [facility]  

 

        2020-10-20 2020-10-20 Outpatient                 Li Jefferson

n [id number]



        00:00:00 00:00:00                         [facility] 



                                                Oncology Oncology 



                                                [facility]  

 

        2020-10-19 2020-10-19 Outpatient         Li Ledezma

rn [id number]



        00:00:00 00:00:00                 [facility] 



                                                Oncology Oncology 



                                                [facility]  

 

        2020-10-16 2020-10-16 Li Torrez 2

[id number]



        00:00:00 00:00:00 [facility] 



                                                Oncology Oncology 



                                                [facility]  

 

        2020-10-09 2020-10-09 Outpatient         Li Ledezma

rn [id number]



        00:00:00 00:00:00                 [facility] 



                                                Oncology Oncology 



                                                Center  [place]  

 

        2020-10-08 2020-10-08 Outpatient                 [facility]

n [id number]



        00:00:00 00:00:00                         [facility] 



                                                Oncology Oncology 



                                                [facility]  

 

        2020-10-05 2020-10-05 Outpatient         Nathanjosé miguel Steffaniedevora Tinoco

rn [id number]



        00:00:00 00:00:00                 [facility] 



                                                Oncology Oncology 



                                                Center  [place]  

 

        2020-10-02 2020-10-02 Outpatient         MaximinoradhaLie

rn [id number]



        00:00:00 00:00:00                 [facility] 



                                                Oncology Oncology 



                                                Center  [place]  

 

        2020 Outpatient         NathanLi valdez Steffaniee

rn [id number]



        00:00:00 00:00:00                 [facility] 



                                                Oncology Oncology 



                                                [facility]  

 

        2020 Outpatient         Nathanjosé miguel Steffaniedevora Tinoco

rn [id number]



        00:00:00 00:00:00                 [facility] 



                                                Oncology Oncology 



                                                [facility]  

 

        2020 Li Torrez 2

[id number]



        00:00:00 00:00:00 [facility] 



                                                Oncology Oncology 



                                                Center  [place]  

 

        2020 Outpatient                 Li [facility]

n [id number]



        00:00:00 00:00:00                         [facility] 



                                                Oncology Oncology 



                                                [facility]  

 

        2020 Darien Torrez [facility] 

[id number]



        00:00:00 00:00:00 Qian           [facility] 



                                                Oncology Oncology 



                                                [facility]  

 

        2020 Outpatient                 [facility]

n [id number]



        00:00:00 00:00:00                         [facility] 



                                                Oncology Oncology 



                                                Center  [place]  

 

        2020 Darien Torrez [facility] 

[id number]



        00:00:00 00:00:00 Qian           [facility] 



                                                Oncology Oncology 



                                                Center  [place]  

 

        2020 Outpatient                 S[facility]he[facility]

n [id number]



        00:00:00 00:00:00                         [facility] 



                                                Oncology Oncology 



                                                Center  [place]  

 

        2020 Outpatient                 Li [facility]

n [id number]



        00:00:00 00:00:00                         [facility] 



                                                Oncology Oncology 



                                                [facility]  

 

        2020 Outpatient                 [facility]

n [id number]



        00:00:00 00:00:00                         n [facility] 



                                                Oncology Oncology 



                                                Center  [place]  

 

        2020 Outpatient                 Li Jefferson

n [id number]



        00:00:00 00:00:00                         n Medical Medical 



                                                Oncology Oncology 



                                                Center  [place]  

 

        2020 Outpatient                 Li Jefferson

n [id number]



        00:00:00 00:00:00                         n Medical Medical 



                                                Oncology Oncology 



                                                Center  [place]  

 

        2020 Outpatient                 Li [facility]

n [id number]



        00:00:00 00:00:00                         n Medical Medical 



                                                Oncology Oncology 



                                                Center  [place]  

 

        2020 Outpatient                 Li Jefferson

n [id number]



        00:00:00 00:00:00                         n Medical Medical 



                                                Oncology Oncology 



                                                Center  [place]  

 

        2020-03-10 2020-03-10 Outpatient                 Li [facility]

n [id number]



        00:00:00 00:00:00                         n Medical Medical 



                                                Oncology Oncology 



                                                [facility]  

 

        2020 Outpatient                 Li Jefferson

n [id number]



        00:00:00 00:00:00                          Medical Medical 



                                                Oncology Oncology 



                                                [facility]  

 

        2019 Outpatient         [facility]   [place] [address]

[id number]



          14:30:00  14:30:00                      Kayli                Children



                                       [id number]



                                                                      

s                                       [id number]



                                                         and    [id number]



                                                        [facility] 



                                                        [facility], 







Social History







                    Smoking Status      Start Date          Stop Date

 

                    Current every day smoker 2020-10-16 00:00:00 2020-10-16 00:0

0:00









                          Social History Observation Description

 

                          Birth Sex                 Male







Vital Signs







                Vital Name      Observation Time Observation Value Comments

 

                Bdy height      2020 09:37:25 67.0000 [in_i]  

 

                Body temperature 2020 09:37:25 98.2000 [degF]  

 

                BMI             2020-10-30 09:01:29 22.6500         

 

                BP smith         2020-10-30 09:01:29 79.0000 mm[Hg]  

 

                Bdy height      2020-10-30 09:01:29 67.0000 [in_i]  

 

                SaO2% BldA PulseOx 2020-10-30 09:01:29 97.0000 %       

 

                Heart rate      2020-10-30 09:01:29 103.0000 /min   

 

                Resp rate       2020-10-30 09:01:29 18.0000 /min    

 

                BP sys          2020-10-30 09:01:29 140.0000 mm[Hg] 

 

                Body temperature 2020-10-30 09:01:29 97.6000 [degF]  

 

                Weight          2020-10-30 09:01:29 144.6000 [lb_av] 

 

                BMI             2020-10-23 09:01:04 22.6800         

 

                BP smith         2020-10-23 09:01:04 84.0000 mm[Hg]  

 

                Bdy height      2020-10-23 09:01:04 67.0000 [in_i]  

 

                SaO2% BldA PulseOx 2020-10-23 09:01:04 98.0000 %       

 

                Heart rate      2020-10-23 09:01:04 97.0000 /min    

 

                Resp rate       2020-10-23 09:01:04 18.0000 /min    

 

                BP sys          2020-10-23 09:01:04 133.0000 mm[Hg] 

 

                Body temperature 2020-10-23 09:01:04 97.4000 [degF]  

 

                Weight          2020-10-23 09:01:04 144.8000 [lb_av] 

 

                BMI             2020-10-19 09:47:24 23.4000         

 

                BP smith         2020-10-19 09:47:24 79.0000 mm[Hg]  

 

                Bdy height      2020-10-19 09:47:24 67.0000 [in_i]  

 

                SaO2% BldA PulseOx 2020-10-19 09:47:24 98.0000 %       

 

                Heart rate      2020-10-19 09:47:24 83.0000 /min    

 

                Resp rate       2020-10-19 09:47:24 18.0000 /min    

 

                BP sys          2020-10-19 09:47:24 140.0000 mm[Hg] 

 

                Body temperature 2020-10-19 09:47:24 98.4000 [degF]  

 

                Weight          2020-10-19 09:47:24 149.4000 [lb_av] 

 

                BMI             2020-10-16 09:23:02 23.0200         

 

                BP smith         2020-10-16 09:23:02 90.0000 mm[Hg]  

 

                Bdy height      2020-10-16 09:23:02 67.0000 [in_i]  

 

                SaO2% BldA PulseOx 2020-10-16 09:23:02 98.0000 %       

 

                Heart rate      2020-10-16 09:23:02 96.0000 /min    

 

                Resp rate       2020-10-16 09:23:02 16.0000 /min    

 

                BP sys          2020-10-16 09:23:02 146.0000 mm[Hg] 

 

                Body temperature 2020-10-16 09:23:02 97.9000 [degF]  

 

                Weight          2020-10-16 09:23:02 147.0000 [lb_av] 

 

                Bdy height      2020-10-09 09:53:52 67.0000 [in_i]  

 

                Body temperature 2020-10-09 09:53:52 97.9000 [degF]  

 

                BMI             2020-10-02 13:08:59 22.2100         

 

                BP smith         2020-10-02 13:08:59 74.0000 mm[Hg]  

 

                Bdy height      2020-10-02 13:08:59 67.0000 [in_i]  

 

                SaO2% BldA PulseOx 2020-10-02 13:08:59 97.0000 %       

 

                Heart rate      2020-10-02 13:08:59 104.0000 /min   

 

                Resp rate       2020-10-02 13:08:59 18.0000 /min    

 

                BP sys          2020-10-02 13:08:59 123.0000 mm[Hg] 

 

                Body temperature 2020-10-02 13:08:59 97.1000 [degF]  

 

                Weight          2020-10-02 13:08:59 141.8000 [lb_av] 

 

                BMI             2020 09:44:33 22.1800         

 

                BP smith         2020 09:44:33 92.0000 mm[Hg]  

 

                Bdy height      2020 09:44:33 67.0000 [in_i]  

 

                SaO2% BldA PulseOx 2020 09:44:33 98.0000 %       

 

                Heart rate      2020 09:44:33 108.0000 /min   

 

                Resp rate       2020 09:44:33 20.0000 /min    

 

                BP sys          2020 09:44:33 155.0000 mm[Hg] 

 

                Body temperature 2020 09:44:33 98.0000 [degF]  

 

                Weight          2020 09:44:33 141.6000 [lb_av] 

 

                BMI             2020 09:00:46 22.2100         

 

                BP smith         2020 09:00:46 76.0000 mm[Hg]  

 

                Bdy height      2020 09:00:46 67.0000 [in_i]  

 

                SaO2% BldA PulseOx 2020 09:00:46 98.0000 %       

 

                Heart rate      2020 09:00:46 105.0000 /min   

 

                Resp rate       2020 09:00:46 18.0000 /min    

 

                BP sys          2020 09:00:46 147.0000 mm[Hg] 

 

                Body temperature 2020 09:00:46 97.7000 [degF]  

 

                Weight          2020 09:00:46 141.8000 [lb_av] 

 

                BMI             2020 15:39:48 22.4000         

 

                BP smith         2020 15:39:48 69.0000 mm[Hg]  

 

                Bdy height      2020 15:39:48 67.0000 [in_i]  

 

                SaO2% BldA PulseOx 2020 15:39:48 97.0000 %       

 

                Heart rate      2020 15:39:48 101.0000 /min   

 

                Resp rate       2020 15:39:48 18.0000 /min    

 

                BP sys          2020 15:39:48 103.0000 mm[Hg] 

 

                Body temperature 2020 15:39:48 98.6000 [degF]  

 

                Weight          2020 15:39:48 143.0000 [lb_av]

## 2020-12-04 ENCOUNTER — HOSPITAL ENCOUNTER (OUTPATIENT)
Dept: HOSPITAL 62 - RAD | Age: 70
End: 2020-12-04
Attending: INTERNAL MEDICINE
Payer: OTHER GOVERNMENT

## 2020-12-04 DIAGNOSIS — J43.2: ICD-10-CM

## 2020-12-04 DIAGNOSIS — C67.0: Primary | ICD-10-CM

## 2020-12-04 PROCEDURE — 74177 CT ABD & PELVIS W/CONTRAST: CPT

## 2020-12-04 PROCEDURE — 82565 ASSAY OF CREATININE: CPT

## 2020-12-04 PROCEDURE — 71260 CT THORAX DX C+: CPT

## 2020-12-04 NOTE — RADIOLOGY REPORT (SQ)
EXAM DESCRIPTION:  CT CHEST WITH; CT ABD/PELVIS WITH IV ONLY



IMAGES COMPLETED DATE/TIME:  12/4/2020 9:23 am



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
E OF BLADDER



CONTRAST TYPE AND DOSE:  Contrast/concentration: Isovue 350.00 mmol/ml; Total Contrast Delivered: 83.
0 ml; Total Saline Delivered: 69.0 ml



RENAL FUNCTION:  Creatinine 0.5 milligrams/deciliter.



COMPARISON:  PET from 3/10/2020.



TECHNIQUE:  CT scan of the chest performed using helical scanning technique with dynamic intravenous 
contrast injection.  Images reviewed with lung, soft tissue and bone windows. Reconstructed coronal a
nd sagittal MPR images reviewed.  All images stored on PACS.

All CT scanners at this facility use dose modulation, iterative reconstruction, and/or weight based d
osing when appropriate to reduce radiation dose to as low as reasonably achievable (ALARA).

[facility]: Dose Right  [facility]: CareDose    [facility]: Dose Right    [facility]: Teradose 4D    [facility]: [employer]



LIMITATIONS:  None.



FINDINGS:  AXILLAE: No adenopathy.

CHEST WALL: No hematoma or mass.

LUNG AND PLEURA: The trachea and main bronchi are patent.  There is re- demonstration of upper lobe p
redominant centrilobular and paraseptal emphysema.  The 18 x 15 mm nodule with lobular margins in the
 right upper lobe (image 27 of series 601) has an internal attenuation of -12.3 Hounsfield units.  Th
ere is no other pulmonary nodule or mass.  There is no acute consolidation, ground-glass opacificatio
n, pleural effusion or pneumothorax.

THYROID: No mass or asymmetry.

HILAR AND MEDIASTINAL STRUCTURES: No adenopathy or mass.

AORTA AND GREAT VESSELS: There is a variant 2 vessel arch with a common origin of brachiocephalic and
 left common carotid arteries.  The ascending thoracic aorta measures 3.8 x 3.8 cm.  There is no thor
acic aortic dissection.

PULMONARY ARTERIES: No filling defects within the main pulmonary artery.

HEART: No cardiomegaly or pericardial effusion.

HARDWARE AND LIFELINES: The tip of the right IJ port terminates at the level of the cavoatrial juncti
on.

BONES: No fracture or osseous lesion.

OTHER: No other findings.



IMPRESSION:  1. The 18 x 15 mm nodule with lobular margins in the right upper lobe (image 27 of serie
s 601) is unchanged in size and has an internal attenuation of -12.3 Hounsfield units.  Given the abs
ence abnormal FDG uptake within the nodule on the correlative PET the favored differential considerat
ion is a hamartoma.

2.  Unchanged upper lobe predominant centrilobular and paraseptal emphysema.  There is no new or enla
rging pulmonary nodule.



COMPARISON:  None.



TECHNIQUE:  CT scan of the abdomen and pelvis performed with intravenous and oral contrast using dayanna
zack scanning technique with dynamic intravenous contrast injection.  Images reviewed with lung, soft 
tissue and bone windows.  Reconstructed coronal and sagittal MPR images reviewed.  Delayed images for
 evaluation of the urinary system also acquired and evaluated. All images stored on PACS.

All CT scanners at this facility use dose modulation, iterative reconstruction, and/or weight based d
osing when appropriate to reduce radiation dose to as low as reasonably achievable (ALARA).

[facility]: Dose Right  [facility]: [employer]    [facility]: Dose Right    [facility]: Teradose 4D    [facility]: [employer]



FINDINGS:  LIVER: The portal veins are patent.  There is no hepatic mass.

SPLEEN: No splenomegaly or splenic mass.

PANCREAS: No acute gross abnormality of the pancreas.

GALLBLADDER: No acute gross abnormality of the gallbladder.

ADRENAL GLANDS: No mass or asymmetry.

RIGHT KIDNEY AND URETER: No solid mass, hydronephrosis, nephrolithiasis, hydroureter or ureterolithia
sis.

LEFT KIDNEY AND URETER: No solid mass, hydronephrosis, nephrolithiasis, hydroureter or ureterolithias
is.

AORTA AND VESSELS: Atherosclerotic calcification of the abdominal aorta and iliac arteries.  There is
 no abdominal aortic aneurysm.

RETROPERITONEUM: No retroperitoneal hemorrhage or mass.

LARGE AND SMALL BOWEL: No bowel obstruction, bowel wall thickening or pericolonic/ perienteric inflam
mation.

APPENDIX: Normal.

ABDOMINAL WALL: The subcutaneous mass lateral to the right 10th rib has increased in size since the p
rior PET and it measures 5.4 x 3 cm compared to 11 mm.

PERITONEAL CAVITY: No mesenteric adenopathy, free intraperitoneal fluid or mesenteric/ omental inflam
mation.

PELVIS: The exophytic mass that arises from the right posterolateral wall of the urinary bladder has 
decreased in size and has a maximum diameter 2.9 cm compared to 4.4 cm on the prior PET.  Since that 
prior PET the patient has the several pelvic nodule/lymph nodes ; these include the necrotic right in
ternal iliac lymph node on image 69 of series 8 that measures 3.2 cm in short axis diameter and the r
ight obturator lymph node (image 70 of series 8) that measures 14 mm in short axis diameter.

BONES: Degenerative spondylosis and facet joint arthropathy of the lumbar spine.  There is no fractur
e or osseous lesion.

OTHER: No other findings.



IMPRESSION:  Findings as above consistent with progression of metastatic disease given the increase i
n size of the subcutaneous nodule lateral to the right 10th rib and the development of pelvic adenopa
thy as detailed above.



TECHNICAL DOCUMENTATION:  JOB ID:  [id number]

Quality ID # 436: Final reports with documentation of one or more dose reduction techniques (e.g., Au
tomated exposure control, adjustment of the mA and/or kV according to patient size, use of iterative 
reconstruction technique)

 2011 [employer]- All Rights Reserved



Reading location - IP/workstation name: [email or web address]

## 2020-12-04 NOTE — RADIOLOGY REPORT (SQ)
EXAM DESCRIPTION:  CT CHEST WITH; CT ABD/PELVIS WITH IV ONLY



IMAGES COMPLETED DATE/TIME:  12/4/2020 9:23 am



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
E OF BLADDER



CONTRAST TYPE AND DOSE:  Contrast/concentration: Isovue 350.00 mmol/ml; Total Contrast Delivered: 83.
0 ml; Total Saline Delivered: 69.0 ml



RENAL FUNCTION:  Creatinine 0.5 milligrams/deciliter.



COMPARISON:  PET from 3/10/2020.



TECHNIQUE:  CT scan of the chest performed using helical scanning technique with dynamic intravenous 
contrast injection.  Images reviewed with lung, soft tissue and bone windows. Reconstructed coronal a
nd sagittal MPR images reviewed.  All images stored on PACS.

All CT scanners at this facility use dose modulation, iterative reconstruction, and/or weight based d
osing when appropriate to reduce radiation dose to as low as reasonably achievable (ALARA).

[facility]: Dose Right  [facility]: CareDose    [facility]: Dose Right    [facility]: Teradose 4D    [facility]: [employer]



LIMITATIONS:  None.



FINDINGS:  AXILLAE: No adenopathy.

CHEST WALL: No hematoma or mass.

LUNG AND PLEURA: The trachea and main bronchi are patent.  There is re- demonstration of upper lobe p
redominant centrilobular and paraseptal emphysema.  The 18 x 15 mm nodule with lobular margins in the
 right upper lobe (image 27 of series 601) has an internal attenuation of -12.3 Hounsfield units.  Th
ere is no other pulmonary nodule or mass.  There is no acute consolidation, ground-glass opacificatio
n, pleural effusion or pneumothorax.

THYROID: No mass or asymmetry.

HILAR AND MEDIASTINAL STRUCTURES: No adenopathy or mass.

AORTA AND GREAT VESSELS: There is a variant 2 vessel arch with a common origin of brachiocephalic and
 left common carotid arteries.  The ascending thoracic aorta measures 3.8 x 3.8 cm.  There is no thor
acic aortic dissection.

PULMONARY ARTERIES: No filling defects within the main pulmonary artery.

HEART: No cardiomegaly or pericardial effusion.

HARDWARE AND LIFELINES: The tip of the right IJ port terminates at the level of the cavoatrial juncti
on.

BONES: No fracture or osseous lesion.

OTHER: No other findings.



IMPRESSION:  1. The 18 x 15 mm nodule with lobular margins in the right upper lobe (image 27 of serie
s 601) is unchanged in size and has an internal attenuation of -12.3 Hounsfield units.  Given the abs
ence abnormal FDG uptake within the nodule on the correlative PET the favored differential considerat
ion is a hamartoma.

2.  Unchanged upper lobe predominant centrilobular and paraseptal emphysema.  There is no new or enla
rging pulmonary nodule.



COMPARISON:  None.



TECHNIQUE:  CT scan of the abdomen and pelvis performed with intravenous and oral contrast using dayanna
zack scanning technique with dynamic intravenous contrast injection.  Images reviewed with lung, soft 
tissue and bone windows.  Reconstructed coronal and sagittal MPR images reviewed.  Delayed images for
 evaluation of the urinary system also acquired and evaluated. All images stored on PACS.

All CT scanners at this facility use dose modulation, iterative reconstruction, and/or weight based d
osing when appropriate to reduce radiation dose to as low as reasonably achievable (ALARA).

[facility]: Dose Right  [facility]: [employer]    [facility]: Dose Right    [facility]: Teradose 4D    [facility]: [employer]



FINDINGS:  LIVER: The portal veins are patent.  There is no hepatic mass.

SPLEEN: No splenomegaly or splenic mass.

PANCREAS: No acute gross abnormality of the pancreas.

GALLBLADDER: No acute gross abnormality of the gallbladder.

ADRENAL GLANDS: No mass or asymmetry.

RIGHT KIDNEY AND URETER: No solid mass, hydronephrosis, nephrolithiasis, hydroureter or ureterolithia
sis.

LEFT KIDNEY AND URETER: No solid mass, hydronephrosis, nephrolithiasis, hydroureter or ureterolithias
is.

AORTA AND VESSELS: Atherosclerotic calcification of the abdominal aorta and iliac arteries.  There is
 no abdominal aortic aneurysm.

RETROPERITONEUM: No retroperitoneal hemorrhage or mass.

LARGE AND SMALL BOWEL: No bowel obstruction, bowel wall thickening or pericolonic/ perienteric inflam
mation.

APPENDIX: Normal.

ABDOMINAL WALL: The subcutaneous mass lateral to the right 10th rib has increased in size since the p
rior PET and it measures 5.4 x 3 cm compared to 11 mm.

PERITONEAL CAVITY: No mesenteric adenopathy, free intraperitoneal fluid or mesenteric/ omental inflam
mation.

PELVIS: The exophytic mass that arises from the right posterolateral wall of the urinary bladder has 
decreased in size and has a maximum diameter 2.9 cm compared to 4.4 cm on the prior PET.  Since that 
prior PET the patient has the several pelvic nodule/lymph nodes ; these include the necrotic right in
ternal iliac lymph node on image 69 of series 8 that measures 3.2 cm in short axis diameter and the r
ight obturator lymph node (image 70 of series 8) that measures 14 mm in short axis diameter.

BONES: Degenerative spondylosis and facet joint arthropathy of the lumbar spine.  There is no fractur
e or osseous lesion.

OTHER: No other findings.



IMPRESSION:  Findings as above consistent with progression of metastatic disease given the increase i
n size of the subcutaneous nodule lateral to the right 10th rib and the development of pelvic adenopa
thy as detailed above.



TECHNICAL DOCUMENTATION:  JOB ID:  [id number]

Quality ID # 436: Final reports with documentation of one or more dose reduction techniques (e.g., Au
tomated exposure control, adjustment of the mA and/or kV according to patient size, use of iterative 
reconstruction technique)

 2011 [employer]- All Rights Reserved



Reading location - IP/workstation name: [email or web address]